# Patient Record
Sex: MALE | Race: WHITE | NOT HISPANIC OR LATINO | Employment: UNEMPLOYED | URBAN - METROPOLITAN AREA
[De-identification: names, ages, dates, MRNs, and addresses within clinical notes are randomized per-mention and may not be internally consistent; named-entity substitution may affect disease eponyms.]

---

## 2017-07-21 ENCOUNTER — LAB (OUTPATIENT)
Dept: INTERNAL MEDICINE | Facility: CLINIC | Age: 57
End: 2017-07-21

## 2017-07-21 DIAGNOSIS — Z00.00 HEALTHCARE MAINTENANCE: ICD-10-CM

## 2017-07-21 DIAGNOSIS — Z79.899 HIGH RISK MEDICATION USE: ICD-10-CM

## 2017-07-21 DIAGNOSIS — I10 ESSENTIAL HYPERTENSION: ICD-10-CM

## 2017-07-21 DIAGNOSIS — E78.1 FAMILIAL HYPERTRIGLYCERIDEMIA: ICD-10-CM

## 2017-07-21 DIAGNOSIS — E11.9 CONTROLLED TYPE 2 DIABETES MELLITUS WITHOUT COMPLICATION, WITHOUT LONG-TERM CURRENT USE OF INSULIN (HCC): ICD-10-CM

## 2017-07-21 DIAGNOSIS — E78.5 HYPERLIPIDEMIA, UNSPECIFIED HYPERLIPIDEMIA TYPE: Primary | ICD-10-CM

## 2017-07-21 DIAGNOSIS — Z12.5 PROSTATE CANCER SCREENING: ICD-10-CM

## 2017-07-21 LAB
ALBUMIN SERPL-MCNC: 4.4 G/DL (ref 3.2–4.8)
ALBUMIN/GLOB SERPL: 1.6 G/DL (ref 1.5–2.5)
ALP SERPL-CCNC: 99 U/L (ref 25–100)
ALT SERPL W P-5'-P-CCNC: 39 U/L (ref 7–40)
ANION GAP SERPL CALCULATED.3IONS-SCNC: 11 MMOL/L (ref 3–11)
ARTICHOKE IGE QN: 126 MG/DL (ref 0–130)
AST SERPL-CCNC: 33 U/L (ref 0–33)
BACTERIA UR QL AUTO: ABNORMAL /HPF
BASOPHILS # BLD AUTO: 0.04 10*3/MM3 (ref 0–0.2)
BASOPHILS NFR BLD AUTO: 0.4 % (ref 0–1)
BILIRUB SERPL-MCNC: 0.4 MG/DL (ref 0.3–1.2)
BILIRUB UR QL STRIP: NEGATIVE
BUN BLD-MCNC: 16 MG/DL (ref 9–23)
BUN/CREAT SERPL: 17.8 (ref 7–25)
CALCIUM SPEC-SCNC: 9.5 MG/DL (ref 8.7–10.4)
CHLORIDE SERPL-SCNC: 104 MMOL/L (ref 99–109)
CHOLEST SERPL-MCNC: 224 MG/DL (ref 0–200)
CLARITY UR: CLEAR
CO2 SERPL-SCNC: 22 MMOL/L (ref 20–31)
COLOR UR: YELLOW
CREAT BLD-MCNC: 0.9 MG/DL (ref 0.6–1.3)
DEPRECATED RDW RBC AUTO: 42.2 FL (ref 37–54)
EOSINOPHIL # BLD AUTO: 0.13 10*3/MM3 (ref 0–0.3)
EOSINOPHIL NFR BLD AUTO: 1.4 % (ref 0–3)
ERYTHROCYTE [DISTWIDTH] IN BLOOD BY AUTOMATED COUNT: 13.9 % (ref 11.3–14.5)
GFR SERPL CREATININE-BSD FRML MDRD: 87 ML/MIN/1.73
GLOBULIN UR ELPH-MCNC: 2.8 GM/DL
GLUCOSE BLD-MCNC: 127 MG/DL (ref 70–100)
GLUCOSE UR STRIP-MCNC: NEGATIVE MG/DL
HBA1C MFR BLD: 5.8 % (ref 4.8–5.6)
HCT VFR BLD AUTO: 48.9 % (ref 38.9–50.9)
HDLC SERPL-MCNC: 32 MG/DL (ref 40–60)
HGB BLD-MCNC: 16.2 G/DL (ref 13.1–17.5)
HGB UR QL STRIP.AUTO: NEGATIVE
HYALINE CASTS UR QL AUTO: ABNORMAL /LPF
IMM GRANULOCYTES # BLD: 0.01 10*3/MM3 (ref 0–0.03)
IMM GRANULOCYTES NFR BLD: 0.1 % (ref 0–0.6)
KETONES UR QL STRIP: NEGATIVE
LEUKOCYTE ESTERASE UR QL STRIP.AUTO: NEGATIVE
LYMPHOCYTES # BLD AUTO: 2.48 10*3/MM3 (ref 0.6–4.8)
LYMPHOCYTES NFR BLD AUTO: 26.4 % (ref 24–44)
MCH RBC QN AUTO: 28.1 PG (ref 27–31)
MCHC RBC AUTO-ENTMCNC: 33.1 G/DL (ref 32–36)
MCV RBC AUTO: 84.7 FL (ref 80–99)
MONOCYTES # BLD AUTO: 0.62 10*3/MM3 (ref 0–1)
MONOCYTES NFR BLD AUTO: 6.6 % (ref 0–12)
NEUTROPHILS # BLD AUTO: 6.1 10*3/MM3 (ref 1.5–8.3)
NEUTROPHILS NFR BLD AUTO: 65.1 % (ref 41–71)
NITRITE UR QL STRIP: NEGATIVE
PH UR STRIP.AUTO: <=5 [PH] (ref 5–8)
PLATELET # BLD AUTO: 214 10*3/MM3 (ref 150–450)
PMV BLD AUTO: 11.9 FL (ref 6–12)
POTASSIUM BLD-SCNC: 4.2 MMOL/L (ref 3.5–5.5)
PROT SERPL-MCNC: 7.2 G/DL (ref 5.7–8.2)
PROT UR QL STRIP: NEGATIVE
PSA SERPL-MCNC: 0.59 NG/ML (ref 0–4)
RBC # BLD AUTO: 5.77 10*6/MM3 (ref 4.2–5.76)
RBC # UR: ABNORMAL /HPF
REF LAB TEST METHOD: ABNORMAL
SODIUM BLD-SCNC: 137 MMOL/L (ref 132–146)
SP GR UR STRIP: 1.02 (ref 1–1.03)
SQUAMOUS #/AREA URNS HPF: ABNORMAL /HPF
TRIGL SERPL-MCNC: 417 MG/DL (ref 0–150)
TSH SERPL DL<=0.05 MIU/L-ACNC: 1.39 MIU/ML (ref 0.35–5.35)
UROBILINOGEN UR QL STRIP: NORMAL
WBC NRBC COR # BLD: 9.38 10*3/MM3 (ref 3.5–10.8)
WBC UR QL AUTO: ABNORMAL /HPF

## 2017-07-21 PROCEDURE — 80061 LIPID PANEL: CPT | Performed by: INTERNAL MEDICINE

## 2017-07-21 PROCEDURE — 82043 UR ALBUMIN QUANTITATIVE: CPT | Performed by: INTERNAL MEDICINE

## 2017-07-21 PROCEDURE — 84443 ASSAY THYROID STIM HORMONE: CPT | Performed by: INTERNAL MEDICINE

## 2017-07-21 PROCEDURE — 80053 COMPREHEN METABOLIC PANEL: CPT | Performed by: INTERNAL MEDICINE

## 2017-07-21 PROCEDURE — 81001 URINALYSIS AUTO W/SCOPE: CPT | Performed by: INTERNAL MEDICINE

## 2017-07-21 PROCEDURE — 82570 ASSAY OF URINE CREATININE: CPT | Performed by: INTERNAL MEDICINE

## 2017-07-21 PROCEDURE — 83036 HEMOGLOBIN GLYCOSYLATED A1C: CPT | Performed by: INTERNAL MEDICINE

## 2017-07-21 PROCEDURE — 85025 COMPLETE CBC W/AUTO DIFF WBC: CPT | Performed by: INTERNAL MEDICINE

## 2017-07-21 PROCEDURE — G0103 PSA SCREENING: HCPCS | Performed by: INTERNAL MEDICINE

## 2017-07-22 LAB
CREAT 24H UR-MCNC: 203.2 MG/DL
MICROALB/CRT. RATIO UR: 3.3 MG/G CREAT (ref 0–30)
MICROALBUMIN UR-MCNC: 6.8 UG/ML

## 2017-07-28 ENCOUNTER — OFFICE VISIT (OUTPATIENT)
Dept: INTERNAL MEDICINE | Facility: CLINIC | Age: 57
End: 2017-07-28

## 2017-07-28 VITALS
HEIGHT: 67 IN | WEIGHT: 185 LBS | SYSTOLIC BLOOD PRESSURE: 120 MMHG | DIASTOLIC BLOOD PRESSURE: 70 MMHG | BODY MASS INDEX: 29.03 KG/M2

## 2017-07-28 DIAGNOSIS — R31.9 HEMATURIA: ICD-10-CM

## 2017-07-28 DIAGNOSIS — E78.1 FAMILIAL HYPERTRIGLYCERIDEMIA: ICD-10-CM

## 2017-07-28 DIAGNOSIS — E78.2 MIXED HYPERLIPIDEMIA: ICD-10-CM

## 2017-07-28 DIAGNOSIS — Z00.00 MEDICARE ANNUAL WELLNESS VISIT, SUBSEQUENT: Primary | ICD-10-CM

## 2017-07-28 DIAGNOSIS — I10 ESSENTIAL HYPERTENSION: ICD-10-CM

## 2017-07-28 DIAGNOSIS — E11.9 CONTROLLED TYPE 2 DIABETES MELLITUS WITHOUT COMPLICATION, WITHOUT LONG-TERM CURRENT USE OF INSULIN (HCC): ICD-10-CM

## 2017-07-28 LAB
BACTERIA UR QL AUTO: ABNORMAL /HPF
BILIRUB UR QL STRIP: NEGATIVE
CLARITY UR: CLEAR
COLOR UR: YELLOW
GLUCOSE UR STRIP-MCNC: NEGATIVE MG/DL
HGB UR QL STRIP.AUTO: NEGATIVE
HYALINE CASTS UR QL AUTO: ABNORMAL /LPF
KETONES UR QL STRIP: NEGATIVE
LEUKOCYTE ESTERASE UR QL STRIP.AUTO: NEGATIVE
NITRITE UR QL STRIP: NEGATIVE
PH UR STRIP.AUTO: <=5 [PH] (ref 5–8)
PROT UR QL STRIP: NEGATIVE
RBC # UR: ABNORMAL /HPF
REF LAB TEST METHOD: ABNORMAL
SP GR UR STRIP: <=1.005 (ref 1–1.03)
SQUAMOUS #/AREA URNS HPF: ABNORMAL /HPF
UROBILINOGEN UR QL STRIP: NORMAL
WBC UR QL AUTO: ABNORMAL /HPF

## 2017-07-28 PROCEDURE — G0444 DEPRESSION SCREEN ANNUAL: HCPCS | Performed by: INTERNAL MEDICINE

## 2017-07-28 PROCEDURE — 99214 OFFICE O/P EST MOD 30 MIN: CPT | Performed by: INTERNAL MEDICINE

## 2017-07-28 PROCEDURE — 81001 URINALYSIS AUTO W/SCOPE: CPT | Performed by: INTERNAL MEDICINE

## 2017-07-28 PROCEDURE — 93000 ELECTROCARDIOGRAM COMPLETE: CPT | Performed by: INTERNAL MEDICINE

## 2017-07-28 PROCEDURE — G0439 PPPS, SUBSEQ VISIT: HCPCS | Performed by: INTERNAL MEDICINE

## 2017-07-28 RX ORDER — ATENOLOL 25 MG/1
25 TABLET ORAL DAILY
Qty: 90 TABLET | Refills: 3 | Status: SHIPPED | OUTPATIENT
Start: 2017-07-28 | End: 2018-07-30 | Stop reason: SDUPTHER

## 2017-07-28 NOTE — ASSESSMENT & PLAN NOTE
Health maintenance - counseling given and patient refuses all vaccines; Tdap 7/11; counseling re: colon CA and prostate CA screening and rationale for screening for early detection/intervention - patient refuses all screening; rec updated eye exam (wears readers; mother has mac degeneration); dental exam recommended (but patient reluctant)    Consultants:  Patient Care Team:  Vilma Brown MD as PCP - General (Internal Medicine)  Miguel Carrillo MD as PCP - Claims Attributed

## 2017-07-28 NOTE — ASSESSMENT & PLAN NOTE
BG control stable with A1C 5.8; encouraged reg phys activity to decr insulin resistance, moderation in unhealthy starches/sweets; f/u A1C in 6 mos recommended but patient wants to do it in 1yr ar next annual wellness

## 2017-07-28 NOTE — PROGRESS NOTES
ANNUAL WELLNESS VISIT    DRUG AND ALCOHOL USE      no alcohol use, no tobacco use and no caffeine use    DIET AND PHYSICAL ACTIVITY     Diet: general    Exercise: frequently   Exercise Details: walking, stretching     MOOD DISORDER AND COGNITIVE SCREENING   Depression Screening Tool Used yes - see PHQ-9   Anxiety Screening Tool Used yes     Mini-Cog Performed   Yes    1. Tell Patient 3 Words table,car,book    2. Administer Clock Test normal    3. Recall 3 words  table,car,book    4. Number Correct Items 3    FUNCTIONAL ABILITY AND LEVEL OF SAFETY   Hearing no hearing loss     Wears Hearing Aids No       Current Activities Independent      none  - see Funct/Cog Status Intake     Fall Risk Assessment       Has difficulty with walking or balance  No         Timed Up and Go (TUG) Test  5 sec.       If >12 sec, normal    ADVANCED DIRECTIVE has NO advance directive - information provided to the patient today    PAIN SCREENING Do you have pain right now? Yes - joint pain, back pain       If so, 1-10 scale: 7     Intermittent     Do you have pain every day? Yes      Probable chronic pain: Yes     Recent Hospitalizations:  No hospitalization(s) within the last year..     MEDICATION REVIEW   - updated and reviewed (see Medication List).   - reviewed for potentially harmful drug-disease interactions in the elderly.   - reviewed for high risk medications in the elderly.   - aspirin use: rec 81mg QD ASA  ____________________________________________  Chief Complaint   Patient presents with   • Medicare Wellness       History of Present Illness  57 y.o.  gentleman presents for updated wellness exam.  Still does not believe in screening tests nor vaccinations.  Continues to go to pain clinic fo percocet.    Review of Systems  Denies headaches, visual changes, CP, palpitations, SOB, cough, abd pain, n/v/d, difficulty with urination, numbness/tingling, falls, mood changes, lightheadedness, hearing changes, rashes.    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  Notes occ irritation as a result.  States he is not worried if there is blood in the urine as \"it is the will of God.\"     All other ROS reviewed and negative.    [facility]  The following portions of the patient's history were reviewed and updated as appropriate: allergies, current medications, past family history, past medical history, past social history, past surgical history and problem list.      Current Outpatient Prescriptions:   •  ALPRAZolam (XANAX) 1 MG tablet, Take 1 mg by mouth 2 (two) times a day as needed., Disp: , Rfl:   •  aspirin-acetaminophen-caffeine (EXCEDRIN MIGRAINE) 250-250-65 MG per tablet, Take 1 tablet by mouth as needed., Disp: , Rfl:   •  atenolol (TENORMIN) 25 MG tablet, Take 1 tablet by mouth Daily., Disp: 90 tablet, Rfl: 3  •  fluticasone (FLONASE) 50 MCG/ACT nasal spray, into each nostril as needed., Disp: , Rfl:   •  loratadine (ALAVERT) 10 MG tablet, Take 10 mg by mouth daily., Disp: , Rfl:   •  oxyCODONE-acetaminophen (PERCOCET)  MG per tablet, Take 1 tablet by mouth 3 (three) times a day as needed., Disp: , Rfl:     VITALS:  /70  Ht 67\" (170.2 cm)  Wt 185 lb (83.9 kg)  BMI 28.98 kg/m2    Physical Exam   Constitutional: He is oriented to person, place, and time. He appears well-developed and well-nourished.   HENT:   Head: Normocephalic.   Right Ear: External ear normal.   Left Ear: External ear normal.   Nose: Nose normal.   Mouth/Throat: Oropharynx is clear and moist and mucous membranes are normal. No oropharyngeal exudate.   Eyes: Conjunctivae and EOM are normal. Pupils are equal, round, and reactive to light.   Neck: Normal range of motion. Neck supple. Carotid bruit is not present (bilaterally). No thyromegaly present.   Cardiovascular: Regular rhythm and normal heart sounds.  " Bradycardia present.    Pulmonary/Chest: Effort normal and breath sounds normal. No respiratory distress.   Abdominal: Soft. Bowel sounds are normal. He exhibits no distension and no mass. There is no hepatosplenomegaly. There is no tenderness.   Musculoskeletal: Normal range of motion. He exhibits no edema.       Vascular Status -  His exam exhibits no right foot edema. His exam exhibits no left foot edema.   Skin Integrity  -  His right foot skin is intact.     Francisco 's left foot skin is intact. .  Lymphadenopathy:     He has no cervical adenopathy.   Neurological: He is alert and oriented to person, place, and time. He has normal reflexes. He displays normal reflexes. No cranial nerve deficit.   Skin: Skin is warm and dry. No rash noted.   Psychiatric: He has a normal mood and affect. His behavior is normal.   Nursing note and vitals reviewed.      LABS  Stable CBC, CMP (), TSH, PSA 0.59, microalb, A1C 5.8  Stable abnl lipids , , HDL 32,   UA (+)RBCs      ECG 12 Lead  Date/Time: 7/28/2017 11:43 PM  Performed by: NATHALIE SPRINGER  Authorized by: NATHALIE SPRINGER   Comparison: compared with previous ECG from 7/25/2016  Similar to previous ECG  Rhythm: sinus rhythm  Rate: normal  BPM: 55  Conduction: conduction normal  ST Segments: ST segments normal  QRS axis: normal  Other findings comments: inverted T wave III  Clinical impression comment: stable EKG            ASSESSMENT/PLAN  Problem List Items Addressed This Visit     Hyperlipidemia     Lipids bord with LDL < 100 and TGs elevated but good for the patient; no meds; decrease saturated fats/chol in the diet as tolerated         Hypertension     BP stable with stable electrolytes on atenolol 25mg QD #90, 3RF         Relevant Medications    atenolol (TENORMIN) 25 MG tablet    Controlled diabetes mellitus type II without complication     BG control stable with A1C 5.8; encouraged reg phys activity to decr insulin resistance, moderation in  unhealthy starches/sweets; f/u A1C in 6 mos recommended but patient wants to do it in 1yr ar next annual wellness           Familial hypertriglyceridemia     TGs elevated but good for the patient; he is aware of risks of elevated TGs without treatment and is satisfied         Medicare annual wellness visit, subsequent     Health maintenance - counseling given and patient refuses all vaccines; Tdap 7/11; counseling re: colon CA and prostate CA screening and rationale for screening for early detection/intervention - patient refuses all screening; rec updated eye exam (wears readers; mother has mac degeneration); dental exam recommended (but patient reluctant)    Consultants:  Patient Care Team:  Vilma Brown MD as PCP - General (Internal Medicine)  Miguel Carrillo MD as PCP - Claims Attributed               Other Visit Diagnoses     Hematuria    -  Primary    Relevant Orders    Urinalysis With Microscopic (Completed)    Urinalysis (Completed)    Urinalysis, Microscopic Only (Completed)          FOLLOW-UP  RTC 1yr for annual wellness visit; fasting labs the week prior to appt (CBC, CMP, TSH, lipids, UA/micro, A1C, microalb, PSA)      Electronically signed by:    Vilma Brown MD  07/28/2017

## 2017-07-28 NOTE — ASSESSMENT & PLAN NOTE
Lipids bord with LDL < 100 and TGs elevated but good for the patient; no meds; decrease saturated fats/chol in the diet as tolerated

## 2017-07-29 NOTE — ASSESSMENT & PLAN NOTE
TGs elevated but good for the patient; he is aware of risks of elevated TGs without treatment and is satisfied

## 2018-07-09 ENCOUNTER — TELEPHONE (OUTPATIENT)
Dept: INTERNAL MEDICINE | Facility: CLINIC | Age: 58
End: 2018-07-09

## 2018-07-09 DIAGNOSIS — I10 ESSENTIAL HYPERTENSION: ICD-10-CM

## 2018-07-09 DIAGNOSIS — E78.2 MIXED HYPERLIPIDEMIA: Primary | ICD-10-CM

## 2018-07-09 DIAGNOSIS — E11.9 CONTROLLED TYPE 2 DIABETES MELLITUS WITHOUT COMPLICATION, WITHOUT LONG-TERM CURRENT USE OF INSULIN (HCC): ICD-10-CM

## 2018-07-09 DIAGNOSIS — M19.90 OSTEOARTHRITIS, UNSPECIFIED OSTEOARTHRITIS TYPE, UNSPECIFIED SITE: ICD-10-CM

## 2018-07-09 DIAGNOSIS — N40.0 BENIGN PROSTATIC HYPERPLASIA, UNSPECIFIED WHETHER LOWER URINARY TRACT SYMPTOMS PRESENT: ICD-10-CM

## 2018-07-23 ENCOUNTER — LAB (OUTPATIENT)
Dept: INTERNAL MEDICINE | Facility: CLINIC | Age: 58
End: 2018-07-23

## 2018-07-23 DIAGNOSIS — E78.2 MIXED HYPERLIPIDEMIA: ICD-10-CM

## 2018-07-23 DIAGNOSIS — I10 ESSENTIAL HYPERTENSION: ICD-10-CM

## 2018-07-23 DIAGNOSIS — E11.9 CONTROLLED TYPE 2 DIABETES MELLITUS WITHOUT COMPLICATION, WITHOUT LONG-TERM CURRENT USE OF INSULIN (HCC): ICD-10-CM

## 2018-07-23 DIAGNOSIS — N40.0 BENIGN PROSTATIC HYPERPLASIA, UNSPECIFIED WHETHER LOWER URINARY TRACT SYMPTOMS PRESENT: ICD-10-CM

## 2018-07-23 LAB
ALBUMIN SERPL-MCNC: 4.52 G/DL (ref 3.2–4.8)
ALBUMIN/GLOB SERPL: 2.1 G/DL (ref 1.5–2.5)
ALP SERPL-CCNC: 137 U/L (ref 25–100)
ALT SERPL W P-5'-P-CCNC: 26 U/L (ref 7–40)
ANION GAP SERPL CALCULATED.3IONS-SCNC: 8 MMOL/L (ref 3–11)
ARTICHOKE IGE QN: 112 MG/DL (ref 0–130)
AST SERPL-CCNC: 29 U/L (ref 0–33)
BACTERIA UR QL AUTO: NORMAL /HPF
BASOPHILS # BLD AUTO: 0.04 10*3/MM3 (ref 0–0.2)
BASOPHILS NFR BLD AUTO: 0.7 % (ref 0–1)
BILIRUB SERPL-MCNC: 0.3 MG/DL (ref 0.3–1.2)
BILIRUB UR QL STRIP: NEGATIVE
BUN BLD-MCNC: 13 MG/DL (ref 9–23)
BUN/CREAT SERPL: 14.8 (ref 7–25)
CALCIUM SPEC-SCNC: 9 MG/DL (ref 8.7–10.4)
CHLORIDE SERPL-SCNC: 107 MMOL/L (ref 99–109)
CHOLEST SERPL-MCNC: 154 MG/DL (ref 0–200)
CLARITY UR: CLEAR
CO2 SERPL-SCNC: 23 MMOL/L (ref 20–31)
COLOR UR: YELLOW
CREAT BLD-MCNC: 0.88 MG/DL (ref 0.6–1.3)
DEPRECATED RDW RBC AUTO: 45.3 FL (ref 37–54)
EOSINOPHIL # BLD AUTO: 0.12 10*3/MM3 (ref 0–0.3)
EOSINOPHIL NFR BLD AUTO: 2.1 % (ref 0–3)
ERYTHROCYTE [DISTWIDTH] IN BLOOD BY AUTOMATED COUNT: 14.4 % (ref 11.3–14.5)
GFR SERPL CREATININE-BSD FRML MDRD: 89 ML/MIN/1.73
GLOBULIN UR ELPH-MCNC: 2.2 GM/DL
GLUCOSE BLD-MCNC: 119 MG/DL (ref 70–100)
GLUCOSE UR STRIP-MCNC: NEGATIVE MG/DL
HBA1C MFR BLD: 6.2 % (ref 4.8–5.6)
HCT VFR BLD AUTO: 46.8 % (ref 38.9–50.9)
HDLC SERPL-MCNC: 33 MG/DL (ref 40–60)
HGB BLD-MCNC: 15.3 G/DL (ref 13.1–17.5)
HGB UR QL STRIP.AUTO: NEGATIVE
HYALINE CASTS UR QL AUTO: NORMAL /LPF
IMM GRANULOCYTES # BLD: 0.01 10*3/MM3 (ref 0–0.03)
IMM GRANULOCYTES NFR BLD: 0.2 % (ref 0–0.6)
KETONES UR QL STRIP: NEGATIVE
LEUKOCYTE ESTERASE UR QL STRIP.AUTO: NEGATIVE
LYMPHOCYTES # BLD AUTO: 1.97 10*3/MM3 (ref 0.6–4.8)
LYMPHOCYTES NFR BLD AUTO: 34.3 % (ref 24–44)
MCH RBC QN AUTO: 28 PG (ref 27–31)
MCHC RBC AUTO-ENTMCNC: 32.7 G/DL (ref 32–36)
MCV RBC AUTO: 85.6 FL (ref 80–99)
MONOCYTES # BLD AUTO: 0.51 10*3/MM3 (ref 0–1)
MONOCYTES NFR BLD AUTO: 8.9 % (ref 0–12)
NEUTROPHILS # BLD AUTO: 3.1 10*3/MM3 (ref 1.5–8.3)
NEUTROPHILS NFR BLD AUTO: 53.8 % (ref 41–71)
NITRITE UR QL STRIP: NEGATIVE
PH UR STRIP.AUTO: <=5 [PH] (ref 5–8)
PLATELET # BLD AUTO: 250 10*3/MM3 (ref 150–450)
PMV BLD AUTO: 12.3 FL (ref 6–12)
POTASSIUM BLD-SCNC: 4.1 MMOL/L (ref 3.5–5.5)
PROT SERPL-MCNC: 6.7 G/DL (ref 5.7–8.2)
PROT UR QL STRIP: NEGATIVE
PSA SERPL-MCNC: 0.34 NG/ML (ref 0–4)
RBC # BLD AUTO: 5.47 10*6/MM3 (ref 4.2–5.76)
RBC # UR: NORMAL /HPF
REF LAB TEST METHOD: NORMAL
SODIUM BLD-SCNC: 138 MMOL/L (ref 132–146)
SP GR UR STRIP: 1.02 (ref 1–1.03)
SQUAMOUS #/AREA URNS HPF: NORMAL /HPF
TRIGL SERPL-MCNC: 129 MG/DL (ref 0–150)
TSH SERPL DL<=0.05 MIU/L-ACNC: 1.37 MIU/ML (ref 0.35–5.35)
UROBILINOGEN UR QL STRIP: NORMAL
WBC NRBC COR # BLD: 5.75 10*3/MM3 (ref 3.5–10.8)
WBC UR QL AUTO: NORMAL /HPF

## 2018-07-23 PROCEDURE — 80053 COMPREHEN METABOLIC PANEL: CPT | Performed by: INTERNAL MEDICINE

## 2018-07-23 PROCEDURE — 85025 COMPLETE CBC W/AUTO DIFF WBC: CPT | Performed by: INTERNAL MEDICINE

## 2018-07-23 PROCEDURE — 83036 HEMOGLOBIN GLYCOSYLATED A1C: CPT | Performed by: INTERNAL MEDICINE

## 2018-07-23 PROCEDURE — 84443 ASSAY THYROID STIM HORMONE: CPT | Performed by: INTERNAL MEDICINE

## 2018-07-23 PROCEDURE — 81001 URINALYSIS AUTO W/SCOPE: CPT | Performed by: INTERNAL MEDICINE

## 2018-07-23 PROCEDURE — 80061 LIPID PANEL: CPT | Performed by: INTERNAL MEDICINE

## 2018-07-23 PROCEDURE — 82043 UR ALBUMIN QUANTITATIVE: CPT | Performed by: INTERNAL MEDICINE

## 2018-07-23 PROCEDURE — 82570 ASSAY OF URINE CREATININE: CPT | Performed by: INTERNAL MEDICINE

## 2018-07-23 PROCEDURE — G0103 PSA SCREENING: HCPCS | Performed by: INTERNAL MEDICINE

## 2018-07-23 RX ORDER — ATENOLOL 25 MG/1
25 TABLET ORAL DAILY
Qty: 90 TABLET | Refills: 3 | OUTPATIENT
Start: 2018-07-23

## 2018-07-24 LAB
CREAT 24H UR-MCNC: 119.5 MG/DL
MICROALBUMIN UR-MCNC: 3.6 UG/ML
MICROALBUMIN/CREAT UR: 3 MG/G CREAT (ref 0–30)

## 2018-07-30 ENCOUNTER — OFFICE VISIT (OUTPATIENT)
Dept: INTERNAL MEDICINE | Facility: CLINIC | Age: 58
End: 2018-07-30

## 2018-07-30 VITALS
BODY MASS INDEX: 27.84 KG/M2 | HEIGHT: 67 IN | RESPIRATION RATE: 16 BRPM | DIASTOLIC BLOOD PRESSURE: 76 MMHG | WEIGHT: 177.38 LBS | HEART RATE: 66 BPM | SYSTOLIC BLOOD PRESSURE: 118 MMHG

## 2018-07-30 DIAGNOSIS — Z00.00 MEDICARE ANNUAL WELLNESS VISIT, SUBSEQUENT: Primary | ICD-10-CM

## 2018-07-30 DIAGNOSIS — E78.2 MIXED HYPERLIPIDEMIA: ICD-10-CM

## 2018-07-30 DIAGNOSIS — E11.9 CONTROLLED TYPE 2 DIABETES MELLITUS WITHOUT COMPLICATION, WITHOUT LONG-TERM CURRENT USE OF INSULIN (HCC): ICD-10-CM

## 2018-07-30 DIAGNOSIS — I10 ESSENTIAL HYPERTENSION: ICD-10-CM

## 2018-07-30 PROCEDURE — 93000 ELECTROCARDIOGRAM COMPLETE: CPT | Performed by: INTERNAL MEDICINE

## 2018-07-30 PROCEDURE — 99214 OFFICE O/P EST MOD 30 MIN: CPT | Performed by: INTERNAL MEDICINE

## 2018-07-30 PROCEDURE — G0439 PPPS, SUBSEQ VISIT: HCPCS | Performed by: INTERNAL MEDICINE

## 2018-07-30 RX ORDER — ATENOLOL 25 MG/1
25 TABLET ORAL DAILY
Qty: 90 TABLET | Refills: 3 | Status: SHIPPED | OUTPATIENT
Start: 2018-07-30 | End: 2019-07-31 | Stop reason: SDUPTHER
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
## 2018-07-30 NOTE — ASSESSMENT & PLAN NOTE
Health maintenance - counseling given and patient refuses all vaccines; counseling re: new Shingrix as well (declined); Tdap 7/11; counseling re: colon CA and prostate CA screening and rationale for screening for early detection/intervention - patient refuses all modalities of screening; rec updated eye exam (last yrs ago, color-blind, wears readers); rec updated dental exam (yrs ago); (+) seat belt use    Consultants:  Patient Care Team:  Vilma Brown MD as PCP - General (Internal Medicine)  Miguel Carrillo MD as PCP - Claims Attributed

## 2018-07-30 NOTE — ASSESSMENT & PLAN NOTE
BG control mildly worsened with A1C 6.2 (was 5.8 in 7/17) no current meds; encouraged reg phys activity to decr insulin resistance, moderation in unhealthy starches/sweets; rec f/u A1C in 6 mos but patient declines, stating he will wait until next year's annual visit

## 2018-07-30 NOTE — ASSESSMENT & PLAN NOTE
BP and electrolytes remains stable on atenolol 25mg QD #90, 3RF; rec low Na diet, increased aerobic exercise; home BP monitoring with goal BP < 130/80

## 2019-07-23 ENCOUNTER — TELEPHONE (OUTPATIENT)
Dept: INTERNAL MEDICINE | Facility: CLINIC | Age: 59
End: 2019-07-23

## 2019-07-23 DIAGNOSIS — E78.2 MIXED HYPERLIPIDEMIA: ICD-10-CM

## 2019-07-23 DIAGNOSIS — E11.9 CONTROLLED TYPE 2 DIABETES MELLITUS WITHOUT COMPLICATION, WITHOUT LONG-TERM CURRENT USE OF INSULIN (HCC): ICD-10-CM

## 2019-07-23 DIAGNOSIS — Z00.00 MEDICARE ANNUAL WELLNESS VISIT, SUBSEQUENT: Primary | ICD-10-CM

## 2019-07-23 DIAGNOSIS — I10 ESSENTIAL HYPERTENSION: ICD-10-CM

## 2019-07-29 ENCOUNTER — LAB (OUTPATIENT)
Dept: INTERNAL MEDICINE | Facility: CLINIC | Age: 59
End: 2019-07-29

## 2019-07-29 DIAGNOSIS — I10 ESSENTIAL HYPERTENSION: ICD-10-CM

## 2019-07-29 DIAGNOSIS — E78.2 MIXED HYPERLIPIDEMIA: ICD-10-CM

## 2019-07-29 DIAGNOSIS — Z00.00 MEDICARE ANNUAL WELLNESS VISIT, SUBSEQUENT: ICD-10-CM

## 2019-07-29 DIAGNOSIS — E11.9 CONTROLLED TYPE 2 DIABETES MELLITUS WITHOUT COMPLICATION, WITHOUT LONG-TERM CURRENT USE OF INSULIN (HCC): ICD-10-CM

## 2019-07-29 LAB
ALBUMIN SERPL-MCNC: 4.3 G/DL (ref 3.5–5.2)
ALBUMIN/GLOB SERPL: 1.7 G/DL
ALP SERPL-CCNC: 105 U/L (ref 39–117)
ALT SERPL W P-5'-P-CCNC: 19 U/L (ref 1–41)
ANION GAP SERPL CALCULATED.3IONS-SCNC: 11.3 MMOL/L (ref 5–15)
AST SERPL-CCNC: 27 U/L (ref 1–40)
BACTERIA UR QL AUTO: NORMAL /HPF
BASOPHILS # BLD AUTO: 0.07 10*3/MM3 (ref 0–0.2)
BASOPHILS NFR BLD AUTO: 1.1 % (ref 0–1.5)
BILIRUB SERPL-MCNC: 0.5 MG/DL (ref 0.2–1.2)
BILIRUB UR QL STRIP: NEGATIVE
BUN BLD-MCNC: 11 MG/DL (ref 6–20)
BUN/CREAT SERPL: 13.4 (ref 7–25)
CALCIUM SPEC-SCNC: 9.1 MG/DL (ref 8.6–10.5)
CHLORIDE SERPL-SCNC: 105 MMOL/L (ref 98–107)
CHOLEST SERPL-MCNC: 149 MG/DL (ref 0–200)
CLARITY UR: CLEAR
CO2 SERPL-SCNC: 23.7 MMOL/L (ref 22–29)
COLOR UR: YELLOW
CREAT BLD-MCNC: 0.82 MG/DL (ref 0.76–1.27)
DEPRECATED RDW RBC AUTO: 47.4 FL (ref 37–54)
EOSINOPHIL # BLD AUTO: 0.18 10*3/MM3 (ref 0–0.4)
EOSINOPHIL NFR BLD AUTO: 2.8 % (ref 0.3–6.2)
ERYTHROCYTE [DISTWIDTH] IN BLOOD BY AUTOMATED COUNT: 14.9 % (ref 12.3–15.4)
GFR SERPL CREATININE-BSD FRML MDRD: 96 ML/MIN/1.73
GLOBULIN UR ELPH-MCNC: 2.5 GM/DL
GLUCOSE BLD-MCNC: 106 MG/DL (ref 65–99)
GLUCOSE UR STRIP-MCNC: NEGATIVE MG/DL
HBA1C MFR BLD: 6.22 % (ref 4.8–5.6)
HCT VFR BLD AUTO: 47.3 % (ref 37.5–51)
HDLC SERPL-MCNC: 37 MG/DL (ref 40–60)
HGB BLD-MCNC: 14.8 G/DL (ref 13–17.7)
HGB UR QL STRIP.AUTO: NEGATIVE
HYALINE CASTS UR QL AUTO: NORMAL /LPF
IMM GRANULOCYTES # BLD AUTO: 0.02 10*3/MM3 (ref 0–0.05)
IMM GRANULOCYTES NFR BLD AUTO: 0.3 % (ref 0–0.5)
KETONES UR QL STRIP: NEGATIVE
LDLC SERPL CALC-MCNC: 91 MG/DL (ref 0–100)
LDLC/HDLC SERPL: 2.45 {RATIO}
LEUKOCYTE ESTERASE UR QL STRIP.AUTO: NEGATIVE
LYMPHOCYTES # BLD AUTO: 1.69 10*3/MM3 (ref 0.7–3.1)
LYMPHOCYTES NFR BLD AUTO: 26.7 % (ref 19.6–45.3)
MCH RBC QN AUTO: 27 PG (ref 26.6–33)
MCHC RBC AUTO-ENTMCNC: 31.3 G/DL (ref 31.5–35.7)
MCV RBC AUTO: 86.2 FL (ref 79–97)
MONOCYTES # BLD AUTO: 0.6 10*3/MM3 (ref 0.1–0.9)
MONOCYTES NFR BLD AUTO: 9.5 % (ref 5–12)
NEUTROPHILS # BLD AUTO: 3.77 10*3/MM3 (ref 1.7–7)
NEUTROPHILS NFR BLD AUTO: 59.6 % (ref 42.7–76)
NITRITE UR QL STRIP: NEGATIVE
NRBC BLD AUTO-RTO: 0 /100 WBC (ref 0–0.2)
PH UR STRIP.AUTO: 6 [PH] (ref 5–8)
PLATELET # BLD AUTO: 285 10*3/MM3 (ref 140–450)
PMV BLD AUTO: 11.7 FL (ref 6–12)
POTASSIUM BLD-SCNC: 4.1 MMOL/L (ref 3.5–5.2)
PROT SERPL-MCNC: 6.8 G/DL (ref 6–8.5)
PROT UR QL STRIP: NEGATIVE
PSA SERPL-MCNC: 0.54 NG/ML (ref 0–4)
RBC # BLD AUTO: 5.49 10*6/MM3 (ref 4.14–5.8)
RBC # UR: NORMAL /HPF
REF LAB TEST METHOD: NORMAL
SODIUM BLD-SCNC: 140 MMOL/L (ref 136–145)
SP GR UR STRIP: 1.01 (ref 1–1.03)
SQUAMOUS #/AREA URNS HPF: NORMAL /HPF
TRIGL SERPL-MCNC: 107 MG/DL (ref 0–150)
TSH SERPL DL<=0.05 MIU/L-ACNC: 1.35 MIU/ML (ref 0.27–4.2)
UROBILINOGEN UR QL STRIP: NORMAL
VLDLC SERPL-MCNC: 21.4 MG/DL (ref 5–40)
WBC NRBC COR # BLD: 6.33 10*3/MM3 (ref 3.4–10.8)
WBC UR QL AUTO: NORMAL /HPF

## 2019-07-29 PROCEDURE — 84443 ASSAY THYROID STIM HORMONE: CPT | Performed by: INTERNAL MEDICINE

## 2019-07-29 PROCEDURE — 80061 LIPID PANEL: CPT | Performed by: INTERNAL MEDICINE

## 2019-07-29 PROCEDURE — 82043 UR ALBUMIN QUANTITATIVE: CPT | Performed by: INTERNAL MEDICINE

## 2019-07-29 PROCEDURE — 80053 COMPREHEN METABOLIC PANEL: CPT | Performed by: INTERNAL MEDICINE

## 2019-07-29 PROCEDURE — 81001 URINALYSIS AUTO W/SCOPE: CPT | Performed by: INTERNAL MEDICINE

## 2019-07-29 PROCEDURE — 82570 ASSAY OF URINE CREATININE: CPT | Performed by: INTERNAL MEDICINE

## 2019-07-29 PROCEDURE — 85025 COMPLETE CBC W/AUTO DIFF WBC: CPT | Performed by: INTERNAL MEDICINE

## 2019-07-29 PROCEDURE — G0103 PSA SCREENING: HCPCS | Performed by: INTERNAL MEDICINE

## 2019-07-29 PROCEDURE — 83036 HEMOGLOBIN GLYCOSYLATED A1C: CPT | Performed by: INTERNAL MEDICINE

## 2019-07-30 LAB
ALBUMIN UR-MCNC: <1.2 MG/L
CREAT UR-MCNC: 61.1 MG/DL
MICROALBUMIN/CREAT UR: NORMAL MG/G

## 2019-08-01 NOTE — ASSESSMENT & PLAN NOTE
Health maintenance - counseling given and patient refuses all vaccines; Tdap 7/11 (Td due 2021); updated new recs re: HAV and Shingrix - patient declines interest; counseling re: colon CA and prostate CA screening and rationale for screening for early detection/intervention - patient refuses all screening, updated him re: option of Cologuard; rec eye exam, which patient declines, noting FH mac degeneration; rec regular dental exam but patient cannot find dentist he trusts - encouraged reg brushing and flossing; (+) seat belt use    Consultants:  Patient Care Team:  Vilma Brown MD as PCP - General (Internal Medicine)

## 2019-08-01 NOTE — ASSESSMENT & PLAN NOTE
BG control stable with A1C 6.22; no meds; encouraged reg phys activity to decr insulin resistance, moderation in unhealthy starches/sweets; f/u A1C in 12 mos per pt

## 2019-08-02 ENCOUNTER — OFFICE VISIT (OUTPATIENT)
Dept: INTERNAL MEDICINE | Facility: CLINIC | Age: 59
End: 2019-08-02

## 2019-08-02 VITALS
HEART RATE: 66 BPM | OXYGEN SATURATION: 98 % | SYSTOLIC BLOOD PRESSURE: 140 MMHG | HEIGHT: 67 IN | DIASTOLIC BLOOD PRESSURE: 90 MMHG | BODY MASS INDEX: 25.9 KG/M2 | WEIGHT: 165 LBS

## 2019-08-02 DIAGNOSIS — E78.1 FAMILIAL HYPERTRIGLYCERIDEMIA: ICD-10-CM

## 2019-08-02 DIAGNOSIS — E11.65 TYPE 2 DIABETES MELLITUS WITH HYPERGLYCEMIA, WITHOUT LONG-TERM CURRENT USE OF INSULIN (HCC): ICD-10-CM

## 2019-08-02 DIAGNOSIS — I10 ESSENTIAL HYPERTENSION: ICD-10-CM

## 2019-08-02 DIAGNOSIS — E78.2 MIXED HYPERLIPIDEMIA: ICD-10-CM

## 2019-08-02 DIAGNOSIS — Z00.00 MEDICARE ANNUAL WELLNESS VISIT, SUBSEQUENT: Primary | ICD-10-CM

## 2019-08-02 PROCEDURE — G0439 PPPS, SUBSEQ VISIT: HCPCS | Performed by: INTERNAL MEDICINE

## 2019-08-02 PROCEDURE — 93000 ELECTROCARDIOGRAM COMPLETE: CPT | Performed by: INTERNAL MEDICINE

## 2019-08-02 PROCEDURE — G0444 DEPRESSION SCREEN ANNUAL: HCPCS | Performed by: INTERNAL MEDICINE

## 2019-08-02 RX ORDER — ATENOLOL 25 MG/1
25 TABLET ORAL DAILY
Qty: 90 TABLET | Refills: 3 | Status: SHIPPED | OUTPATIENT
Start: 2019-08-02 | End: 2019-11-05 | Stop reason: SDUPTHER

## 2019-08-02 NOTE — PROGRESS NOTES
"  ANNUAL WELLNESS VISIT    DRUG AND ALCOHOL USE      no alcohol use, no tobacco use and no caffeine use    DIET AND PHYSICAL ACTIVITY     Diet: general    Exercise: daily   Exercise Details: walking    MOOD DISORDER AND COGNITIVE SCREENING   Depression Screening Tool Used yes - see PHQ-9; components reviewed with patient; 15- min counseling re: questionnaire items; occ feeling blue but emphasized \"I am blessed\"; enjoys time with fam and friends; daughter in [place] keeps in touch - 1 granddaughter with full scholarship to college; grandson with cerebral palsy (one arm shorter) but working as  (like patient did); has chronic issues with pain and sleep, also assoc'd with waxing and waning energy; occ has some difficulty with concentration; overall stable - no interest in meds or counseling   Anxiety Screening Tool Used yes     Mini-Cog Performed   Yes    1. Tell Patient 3 Words apple table thelma    2. Administer Clock Test normal    3. Recall 3 words  apple table thelma    4. Number Correct Items 3    FUNCTIONAL ABILITY AND LEVEL OF SAFETY   Hearing Mild hearing loss     Wears Hearing Aids No       Current Activities Independent      none  - see Funct/Cog Status Intake     Fall Risk Assessment       Has difficulty with walking or balance  No             Timed Up and Go (TUG) Test  9 sec.      If >12 sec, normal      ADVANCED DIRECTIVE Patient does not have an advance directive - not interested in additional information    PAIN SCREENING Do you have pain right now? yes      If so, 1-10 scale: 4     Intermittent      Do you have pain every day? No      Probable chronic pain: Yes     Recent Hospitalizations:  No recent hospitalization(s)..     MEDICATION REVIEW   - updated and reviewed (see Medication List).   - reviewed for potentially harmful drug-disease interactions in the elderly.   - reviewed for high risk medications in the elderly.   - aspirin use: No    BMI  Body mass index is 25.84 kg/m².    Patient's " "Body mass index is 25.84 kg/m². BMI is above normal parameters. Recommendations include: exercise counseling and nutrition counseling.    _________________________________________________________  Chief Complaint   Patient presents with   • Medicare Wellness-subsequent   • Hypertension   • Hyperlipidemia       History of Present Illness  59 y.o.  gentleman presents for updated wellness visit.  States BPs have been 130/60s; pulse in the 50-60s, even with decreased dose of atenolol. States increased pain today; also stress of driving here, thinks this is why BP is elevated today.  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    Review of Systems  Denies headaches, visual changes (uses readers, does not go to eye doctor), CP, palpitations, SOB, cough, abd pain, n/v/d, difficulty with urination, numbness/tingling, falls, mood changes, lightheadedness, hearing changes, rashes.    Does not go to dentist; has no trust ever since fillings fell out long time ago.     All other ROS reviewed and negative.    [facility]  The following portions of the patient's history were reviewed and updated as appropriate: allergies, current medications, past family history, past medical history, past social history, past surgical history and problem list.  FH: denies colon CA in the family    Current Outpatient Medications:   •  ALPRAZolam (XANAX) 1 MG tablet, BID prn  •  aspirin-acetaminophen-caffeine (EXCEDRIN MIGRAINE) 250-250-65 MG prn  •  atenolol (TENORMIN) 25 MG QD  •  fluticasone (FLONASE) 50 MCG/ACT nasal spray AD  •  loratadine (ALAVERT) 10 MG QD  •  oxyCODONE-acetaminophen (PERCOCET)  MG TID prn      VITALS:  /90   Pulse 66   Ht 170.2 cm (67\")   Wt 74.8 kg (165 lb)   SpO2 98%   BMI 25.84 kg/m²     Physical Exam   Constitutional: He is oriented to person, place, and time. He appears well-developed and well-nourished.   HENT:   Head: Normocephalic.   Right Ear: External ear normal.   Left Ear: External ear " normal.   Nose: Nose normal.   Mouth/Throat: Oropharynx is clear and moist and mucous membranes are normal. No oropharyngeal exudate.   Eyes: Conjunctivae and EOM are normal. Pupils are equal, round, and reactive to light.   Neck: Normal range of motion. Neck supple. Carotid bruit is not present (bilaterally). No thyromegaly present.   Cardiovascular: Normal rate, regular rhythm and normal heart sounds.   Pulmonary/Chest: Effort normal and breath sounds normal. No respiratory distress. He has no wheezes. He has no rales.   Abdominal: Soft. Bowel sounds are normal. He exhibits no distension and no mass. There is no hepatosplenomegaly. There is no tenderness.   Genitourinary:   Genitourinary Comments: declines /NIR exam   Musculoskeletal: Normal range of motion. He exhibits no edema.   Lymphadenopathy:     He has no cervical adenopathy.   Neurological: He is alert and oriented to person, place, and time. He displays normal reflexes. No cranial nerve deficit.   Skin: Skin is warm and dry. No rash noted.   Psychiatric: He has a normal mood and affect. His behavior is normal.   Nursing note and vitals reviewed.      LABS  Results for orders placed or performed in visit on 07/29/19   Comprehensive Metabolic Panel   Result Value Ref Range    Glucose 106 (H) 65 - 99 mg/dL    BUN 11 6 - 20 mg/dL    Creatinine 0.82 0.76 - 1.27 mg/dL    Sodium 140 136 - 145 mmol/L    Potassium 4.1 3.5 - 5.2 mmol/L    Chloride 105 98 - 107 mmol/L    CO2 23.7 22.0 - 29.0 mmol/L    Calcium 9.1 8.6 - 10.5 mg/dL    Total Protein 6.8 6.0 - 8.5 g/dL    Albumin 4.30 3.50 - 5.20 g/dL    ALT (SGPT) 19 1 - 41 U/L    AST (SGOT) 27 1 - 40 U/L    Alkaline Phosphatase 105 39 - 117 U/L    Total Bilirubin 0.5 0.2 - 1.2 mg/dL    eGFR Non African Amer 96 >60 mL/min/1.73    Globulin 2.5 gm/dL    A/G Ratio 1.7 g/dL    BUN/Creatinine Ratio 13.4 7.0 - 25.0    Anion Gap 11.3 5.0 - 15.0 mmol/L   Lipid Panel   Result Value Ref Range    Total Cholesterol 149 0 - 200  mg/dL    Triglycerides 107 0 - 150 mg/dL    HDL Cholesterol 37 (L) 40 - 60 mg/dL    LDL Cholesterol  91 0 - 100 mg/dL    VLDL Cholesterol 21.4 5 - 40 mg/dL    LDL/HDL Ratio 2.45    TSH   Result Value Ref Range    TSH 1.350 0.270 - 4.200 mIU/mL   Microalbumin / Creatinine Urine Ratio - Urine, Clean Catch   Result Value Ref Range    Microalbumin/Creatinine Ratio  mg/g    Creatinine, Urine 61.1 mg/dL    Microalbumin, Urine <1.2 mg/L   PSA Screen   Result Value Ref Range    PSA 0.537 0.000 - 4.000 ng/mL   Hemoglobin A1c   Result Value Ref Range    Hemoglobin A1C 6.22 (H) 4.80 - 5.60 %   CBC Auto Differential   Result Value Ref Range    WBC 6.33 3.40 - 10.80 10*3/mm3    RBC 5.49 4.14 - 5.80 10*6/mm3    Hemoglobin 14.8 13.0 - 17.7 g/dL    Hematocrit 47.3 37.5 - 51.0 %    MCV 86.2 79.0 - 97.0 fL    MCH 27.0 26.6 - 33.0 pg    MCHC 31.3 (L) 31.5 - 35.7 g/dL    RDW 14.9 12.3 - 15.4 %    RDW-SD 47.4 37.0 - 54.0 fl    MPV 11.7 6.0 - 12.0 fL    Platelets 285 140 - 450 10*3/mm3    Neutrophil % 59.6 42.7 - 76.0 %    Lymphocyte % 26.7 19.6 - 45.3 %    Monocyte % 9.5 5.0 - 12.0 %    Eosinophil % 2.8 0.3 - 6.2 %    Basophil % 1.1 0.0 - 1.5 %    Immature Grans % 0.3 0.0 - 0.5 %    Neutrophils, Absolute 3.77 1.70 - 7.00 10*3/mm3    Lymphocytes, Absolute 1.69 0.70 - 3.10 10*3/mm3    Monocytes, Absolute 0.60 0.10 - 0.90 10*3/mm3    Eosinophils, Absolute 0.18 0.00 - 0.40 10*3/mm3    Basophils, Absolute 0.07 0.00 - 0.20 10*3/mm3    Immature Grans, Absolute 0.02 0.00 - 0.05 10*3/mm3    nRBC 0.0 0.0 - 0.2 /100 WBC   Urinalysis without microscopic (no culture) - Urine, Clean Catch   Result Value Ref Range    Color, UA Yellow Yellow, Straw    Appearance, UA Clear Clear    pH, UA 6.0 5.0 - 8.0    Specific Gravity, UA 1.012 1.005 - 1.030    Glucose, UA Negative Negative    Ketones, UA Negative Negative    Bilirubin, UA Negative Negative    Blood, UA Negative Negative    Protein, UA Negative Negative    Leuk Esterase, UA Negative Negative     Nitrite, UA Negative Negative    Urobilinogen, UA 0.2 E.U./dL 0.2 - 1.0 E.U./dL   Urinalysis, Microscopic Only - Urine, Clean Catch   Result Value Ref Range    RBC, UA 0-2 None Seen, 0-2 /HPF    WBC, UA 0-2 None Seen, 0-2 /HPF    Bacteria, UA None Seen None Seen /HPF    Squamous Epithelial Cells, UA 0-2 None Seen, 0-2 /HPF    Hyaline Casts, UA None Seen None Seen /LPF    Methodology Automated Microscopy        ECG 12 Lead  Date/Time: 8/2/2019 10:13 AM  Performed by: Vilma Brown MD  Authorized by: Vilma Brown MD   Comparison: compared with previous ECG from 7/30/2018  Similar to previous ECG  Rhythm: sinus rhythm  Rate: normal  BPM: 65  Conduction: conduction normal  ST Segments: ST segments normal  T Waves: T waves normal  T inversion: III (isolated)  QRS axis: normal  Clinical impression comment: stable EKG            ASSESSMENT/PLAN  Problem List Items Addressed This Visit     Type 2 diabetes mellitus with hyperglycemia, without long-term current use of insulin (CMS/[facility])     BG control stable with A1C 6.22; no meds; encouraged reg phys activity to decr insulin resistance, moderation in unhealthy starches/sweets; f/u A1C in 12 mos per pt           Medicare annual wellness visit, subsequent - Primary     Health maintenance - counseling given and patient refuses all vaccines; Tdap 7/11 (Td due 2021); updated new recs re: HAV and Shingrix - patient declines interest; counseling re: colon CA and prostate CA screening and rationale for screening for early detection/intervention - patient refuses all screening, updated him re: option of Cologuard; rec eye exam, which patient declines, noting FH mac degeneration; rec regular dental exam but patient cannot find dentist he trusts - encouraged reg brushing and flossing; (+) seat belt use    Consultants:  Patient Care Team:  Vilma Brown MD as PCP - General (Internal Medicine)             Hypertension     BP and electrolytes stable on atenolol 25mg QD #90, 3RF          Relevant Medications    atenolol (TENORMIN) 25 MG tablet    Other Relevant Orders    ECG 12 Lead (Completed)    Hyperlipidemia     Lipids stable/acceptable; LDL bord 91 (Goal <70) and TGs wnl at 107         Familial hypertriglyceridemia     Unexpected excellent ; no meds               FOLLOW-UP  RTC 1yr for annual wellness; fasting labs prior to appt (CBC, CMP, TSH, lipids, UA/micr, PSA, A1C, microalb)    Electronically signed by:    Vilma Brown MD  08/02/2019

## 2019-11-05 DIAGNOSIS — I10 ESSENTIAL HYPERTENSION: ICD-10-CM

## 2019-11-05 RX ORDER — ATENOLOL 25 MG/1
TABLET ORAL
Qty: 90 TABLET | Refills: 3 | Status: SHIPPED | OUTPATIENT
Start: 2019-11-05 | End: 2020-07-22

## 2020-07-22 DIAGNOSIS — I10 ESSENTIAL HYPERTENSION: ICD-10-CM

## 2020-07-22 RX ORDER — ATENOLOL 25 MG/1
TABLET ORAL
Qty: 90 TABLET | Refills: 3 | Status: SHIPPED | OUTPATIENT
Start: 2020-07-22 | End: 2020-08-03 | Stop reason: SDUPTHER

## 2020-07-27 ENCOUNTER — TELEPHONE (OUTPATIENT)
Dept: INTERNAL MEDICINE | Facility: CLINIC | Age: 60
End: 2020-07-27

## 2020-07-27 DIAGNOSIS — Z12.5 SCREENING FOR PROSTATE CANCER: ICD-10-CM

## 2020-07-27 DIAGNOSIS — I10 ESSENTIAL HYPERTENSION: ICD-10-CM

## 2020-07-27 DIAGNOSIS — Z00.00 MEDICARE ANNUAL WELLNESS VISIT, SUBSEQUENT: Primary | ICD-10-CM

## 2020-07-27 DIAGNOSIS — E78.2 MIXED HYPERLIPIDEMIA: ICD-10-CM

## 2020-07-27 DIAGNOSIS — E11.65 TYPE 2 DIABETES MELLITUS WITH HYPERGLYCEMIA, WITHOUT LONG-TERM CURRENT USE OF INSULIN (HCC): ICD-10-CM

## 2020-08-02 NOTE — ASSESSMENT & PLAN NOTE
Update A1C; ADDENDUM: stable/improved A1C 5.7; encouraged reg phys activity to decr insulin resistance, moderation in unhealthy starches/sweets; f/u A1C in 12 mos

## 2020-08-02 NOTE — ASSESSMENT & PLAN NOTE
Update lipids with goal LDL < 130, goal TG < 150; no meds; ADDENDUM: lipids at goal with LDL 93 and , no meds

## 2020-08-02 NOTE — ASSESSMENT & PLAN NOTE
Update lipids with goal LDL < 130, goal TG < 150; no meds; ADDENDUM: TGs very good at 135, no meds

## 2020-08-02 NOTE — ASSESSMENT & PLAN NOTE
Health maintenance - counseling given and patient refuses all vaccines; Tdap 7/11 (Next Td due 2021 if patient willing); counseling re: colon CA and prostate CA screening and rationale for screening for early detection/intervention - patient refuses all screening; rec updated eye exam (yrs ago); rec updated dental exam patient declines); (+) seat belt use    Consultants:  Pain clinic - name unknown

## 2020-08-03 ENCOUNTER — LAB (OUTPATIENT)
Dept: LAB | Facility: HOSPITAL | Age: 60
End: 2020-08-03

## 2020-08-03 ENCOUNTER — OFFICE VISIT (OUTPATIENT)
Dept: INTERNAL MEDICINE | Facility: CLINIC | Age: 60
End: 2020-08-03

## 2020-08-03 ENCOUNTER — TELEPHONE (OUTPATIENT)
Dept: INTERNAL MEDICINE | Facility: CLINIC | Age: 60
End: 2020-08-03

## 2020-08-03 VITALS
WEIGHT: 163 LBS | HEART RATE: 54 BPM | BODY MASS INDEX: 25.58 KG/M2 | OXYGEN SATURATION: 99 % | SYSTOLIC BLOOD PRESSURE: 134 MMHG | DIASTOLIC BLOOD PRESSURE: 78 MMHG | HEIGHT: 67 IN

## 2020-08-03 DIAGNOSIS — K21.9 GASTROESOPHAGEAL REFLUX DISEASE, ESOPHAGITIS PRESENCE NOT SPECIFIED: ICD-10-CM

## 2020-08-03 DIAGNOSIS — E78.1 FAMILIAL HYPERTRIGLYCERIDEMIA: ICD-10-CM

## 2020-08-03 DIAGNOSIS — I10 ESSENTIAL HYPERTENSION: ICD-10-CM

## 2020-08-03 DIAGNOSIS — Z00.00 MEDICARE ANNUAL WELLNESS VISIT, SUBSEQUENT: ICD-10-CM

## 2020-08-03 DIAGNOSIS — Z12.5 SCREENING FOR PROSTATE CANCER: ICD-10-CM

## 2020-08-03 DIAGNOSIS — Z00.00 MEDICARE ANNUAL WELLNESS VISIT, SUBSEQUENT: Primary | ICD-10-CM

## 2020-08-03 DIAGNOSIS — E78.2 MIXED HYPERLIPIDEMIA: ICD-10-CM

## 2020-08-03 DIAGNOSIS — E11.65 TYPE 2 DIABETES MELLITUS WITH HYPERGLYCEMIA, WITHOUT LONG-TERM CURRENT USE OF INSULIN (HCC): ICD-10-CM

## 2020-08-03 LAB
ALBUMIN SERPL-MCNC: 4.5 G/DL (ref 3.5–5.2)
ALBUMIN UR-MCNC: <1.2 MG/DL
ALBUMIN/GLOB SERPL: 1.8 G/DL
ALP SERPL-CCNC: 91 U/L (ref 39–117)
ALT SERPL W P-5'-P-CCNC: 14 U/L (ref 1–41)
ANION GAP SERPL CALCULATED.3IONS-SCNC: 9.3 MMOL/L (ref 5–15)
AST SERPL-CCNC: 18 U/L (ref 1–40)
BASOPHILS # BLD AUTO: 0.04 10*3/MM3 (ref 0–0.2)
BASOPHILS NFR BLD AUTO: 0.4 % (ref 0–1.5)
BILIRUB SERPL-MCNC: 0.5 MG/DL (ref 0–1.2)
BILIRUB UR QL STRIP: NEGATIVE
BUN SERPL-MCNC: 8 MG/DL (ref 8–23)
BUN/CREAT SERPL: 10.1 (ref 7–25)
CALCIUM SPEC-SCNC: 9.2 MG/DL (ref 8.6–10.5)
CHLORIDE SERPL-SCNC: 106 MMOL/L (ref 98–107)
CHOLEST SERPL-MCNC: 154 MG/DL (ref 0–200)
CLARITY UR: ABNORMAL
CO2 SERPL-SCNC: 24.7 MMOL/L (ref 22–29)
COLOR UR: YELLOW
CREAT SERPL-MCNC: 0.79 MG/DL (ref 0.76–1.27)
CREAT UR-MCNC: 176.1 MG/DL
DEPRECATED RDW RBC AUTO: 43 FL (ref 37–54)
EOSINOPHIL # BLD AUTO: 0.04 10*3/MM3 (ref 0–0.4)
EOSINOPHIL NFR BLD AUTO: 0.4 % (ref 0.3–6.2)
ERYTHROCYTE [DISTWIDTH] IN BLOOD BY AUTOMATED COUNT: 14.1 % (ref 12.3–15.4)
GFR SERPL CREATININE-BSD FRML MDRD: 100 ML/MIN/1.73
GLOBULIN UR ELPH-MCNC: 2.5 GM/DL
GLUCOSE SERPL-MCNC: 104 MG/DL (ref 65–99)
GLUCOSE UR STRIP-MCNC: NEGATIVE MG/DL
HBA1C MFR BLD: 5.7 % (ref 4.8–5.6)
HCT VFR BLD AUTO: 45.9 % (ref 37.5–51)
HCV AB SER DONR QL: NORMAL
HDLC SERPL-MCNC: 34 MG/DL (ref 40–60)
HGB BLD-MCNC: 14.9 G/DL (ref 13–17.7)
HGB UR QL STRIP.AUTO: NEGATIVE
IMM GRANULOCYTES # BLD AUTO: 0.02 10*3/MM3 (ref 0–0.05)
IMM GRANULOCYTES NFR BLD AUTO: 0.2 % (ref 0–0.5)
KETONES UR QL STRIP: NEGATIVE
LDLC SERPL CALC-MCNC: 93 MG/DL (ref 0–100)
LDLC/HDLC SERPL: 2.74 {RATIO}
LEUKOCYTE ESTERASE UR QL STRIP.AUTO: NEGATIVE
LYMPHOCYTES # BLD AUTO: 1.26 10*3/MM3 (ref 0.7–3.1)
LYMPHOCYTES NFR BLD AUTO: 14.1 % (ref 19.6–45.3)
MCH RBC QN AUTO: 27.2 PG (ref 26.6–33)
MCHC RBC AUTO-ENTMCNC: 32.5 G/DL (ref 31.5–35.7)
MCV RBC AUTO: 83.9 FL (ref 79–97)
MICROALBUMIN/CREAT UR: NORMAL MG/G{CREAT}
MONOCYTES # BLD AUTO: 0.63 10*3/MM3 (ref 0.1–0.9)
MONOCYTES NFR BLD AUTO: 7 % (ref 5–12)
NEUTROPHILS NFR BLD AUTO: 6.95 10*3/MM3 (ref 1.7–7)
NEUTROPHILS NFR BLD AUTO: 77.9 % (ref 42.7–76)
NITRITE UR QL STRIP: NEGATIVE
NRBC BLD AUTO-RTO: 0 /100 WBC (ref 0–0.2)
PH UR STRIP.AUTO: <=5 [PH] (ref 5–8)
PLATELET # BLD AUTO: 236 10*3/MM3 (ref 140–450)
PMV BLD AUTO: 11.6 FL (ref 6–12)
POTASSIUM SERPL-SCNC: 4.4 MMOL/L (ref 3.5–5.2)
PROT SERPL-MCNC: 7 G/DL (ref 6–8.5)
PROT UR QL STRIP: NEGATIVE
PSA SERPL-MCNC: 0.59 NG/ML (ref 0–4)
RBC # BLD AUTO: 5.47 10*6/MM3 (ref 4.14–5.8)
SODIUM SERPL-SCNC: 140 MMOL/L (ref 136–145)
SP GR UR STRIP: 1.03 (ref 1–1.03)
TRIGL SERPL-MCNC: 135 MG/DL (ref 0–150)
TSH SERPL DL<=0.05 MIU/L-ACNC: 1.29 UIU/ML (ref 0.27–4.2)
UROBILINOGEN UR QL STRIP: ABNORMAL
VLDLC SERPL-MCNC: 27 MG/DL (ref 5–40)
WBC # BLD AUTO: 8.94 10*3/MM3 (ref 3.4–10.8)

## 2020-08-03 PROCEDURE — 82043 UR ALBUMIN QUANTITATIVE: CPT | Performed by: INTERNAL MEDICINE

## 2020-08-03 PROCEDURE — 93000 ELECTROCARDIOGRAM COMPLETE: CPT | Performed by: INTERNAL MEDICINE

## 2020-08-03 PROCEDURE — 84443 ASSAY THYROID STIM HORMONE: CPT | Performed by: INTERNAL MEDICINE

## 2020-08-03 PROCEDURE — G0439 PPPS, SUBSEQ VISIT: HCPCS | Performed by: INTERNAL MEDICINE

## 2020-08-03 PROCEDURE — 85025 COMPLETE CBC W/AUTO DIFF WBC: CPT | Performed by: INTERNAL MEDICINE

## 2020-08-03 PROCEDURE — G0444 DEPRESSION SCREEN ANNUAL: HCPCS | Performed by: INTERNAL MEDICINE

## 2020-08-03 PROCEDURE — 83036 HEMOGLOBIN GLYCOSYLATED A1C: CPT | Performed by: INTERNAL MEDICINE

## 2020-08-03 PROCEDURE — 80053 COMPREHEN METABOLIC PANEL: CPT | Performed by: INTERNAL MEDICINE

## 2020-08-03 PROCEDURE — 86803 HEPATITIS C AB TEST: CPT | Performed by: INTERNAL MEDICINE

## 2020-08-03 PROCEDURE — 99497 ADVNCD CARE PLAN 30 MIN: CPT | Performed by: INTERNAL MEDICINE

## 2020-08-03 PROCEDURE — 80061 LIPID PANEL: CPT | Performed by: INTERNAL MEDICINE

## 2020-08-03 PROCEDURE — G0103 PSA SCREENING: HCPCS | Performed by: INTERNAL MEDICINE

## 2020-08-03 PROCEDURE — 81001 URINALYSIS AUTO W/SCOPE: CPT | Performed by: INTERNAL MEDICINE

## 2020-08-03 PROCEDURE — 82570 ASSAY OF URINE CREATININE: CPT | Performed by: INTERNAL MEDICINE

## 2020-08-03 RX ORDER — ATENOLOL 25 MG/1
12.5 TABLET ORAL DAILY
Qty: 45 TABLET | Refills: 3 | Status: SHIPPED | OUTPATIENT
Start: 2020-08-03 | End: 2021-08-07 | Stop reason: SDUPTHER

## 2020-08-03 NOTE — PROGRESS NOTES
ANNUAL WELLNESS VISIT    DRUG AND ALCOHOL USE      no alcohol use, no tobacco use and no caffeine use    DIET AND PHYSICAL ACTIVITY     Diet: general    Exercise: frequently   Exercise Details: walking    MOOD DISORDER AND COGNITIVE SCREENING   Depression Screening Tool Used yes - see PHQ-9; components reviewed with patient; 15-min counseling done -questionnaire items reviewed and he states that he feels great, putting his care in the guard.  Denies feeling depressed, hopeless, or not having pleasure in doing things.  Denies concerns with sleep, appetite, concentration, slow movements, negative thoughts.  Screening is negative for depression.   Anxiety Screening Tool Used yes     Mini-Cog Performed   Yes    1. Tell Patient 3 Words apple table thelma    2. Administer Clock Test normal    3. Recall 3 words  apple table thelma    4. Number Correct Items 3    FUNCTIONAL ABILITY AND LEVEL OF SAFETY   Hearing mild hearing loss     Wears Hearing Aids No       Current Activities Independent      none  - see Funct/Cog Status Intake     Fall Risk Assessment       Has difficulty with walking or balance  No         Timed Up and Go (TUG) Test  8 sec.       If >12 sec, normal    ADVANCED DIRECTIVE  Advance Care Planning   ACP discussion was held with the patient during this visit. Patient does not have an advance directive, information provided.     Advance Care Planning Discussion:  16 min or more spent on counseling; patient does not have advanced directive and living will.  Reviewed desires for end of life care, which is to have comfort care.  Patient does not want extraordinary life-sustaining measures, including no chest compression, shocks, intubation/ventilator use, feeding tube, or prolonged artificial life support.  Reviewed code status options, meanings, desires. Patient 's code status is DNR.   Encouraged patient to ensure family is aware of desires/preferences. States that family is far away and unsure they would be  appropriate health care surrogate. Has some tense family relationships. Patient amenable to having ACP team assist with paperwork.      PAIN SCREENING Do you have pain right now? yes      If so, 1-10 scale: 7     Constant     Do you have pain every day? Yes      Probable chronic pain: Yes  - goes to pain clinic and is on chronic narcotics    Recent Hospitalizations:  No recent hospitalization(s)..     MEDICATION REVIEW   - updated and reviewed (see Medication List).   - reviewed for potentially harmful drug-disease interactions in the elderly.   - reviewed for high risk medications in the elderly.   - aspirin use: No    BMI  Body mass index is 25.53 kg/m².    Patient's Body mass index is 25.53 kg/m². BMI is above normal parameters. Recommendations include: exercise counseling and nutrition counseling.    _________________________________________________________    Chief Complaint   Patient presents with   • Medicare Wellness-subsequent   • Hypertension       History of Present Illness  60 y.o.  gentleman presents for updated wellness visit. Thinks decreasing atenolol is helping his weight loss.  States energy is good. Reflux is stable by watching his diet; does not take medication currently.    Home BPs 130s/70s.    Reports losing his big toenail twice, most recently last year; toenail has grown out now. Denies pain.    Review of Systems  Denies headaches, visual changes, CP, palpitations, SOB, cough, abd pain, n/v/d, difficulty with urination, numbness/tingling, falls, mood changes, lightheadedness, hearing changes, rashes.     All other ROS reviewed and negative.    [facility]  The following portions of the patient's history were reviewed and updated as appropriate: allergies, current medications, past family history, past medical history, past social history, past surgical history and problem list.      Current Outpatient Medications:   •  ALPRAZolam (XANAX) 1 MG BID prn  •  aspirin-acetaminophen-caffeine  "(EXCEDRIN MIGRAINE) 250-250-65 MG prn  •  atenolol (TENORMIN) 25 MG 1/2 QD  •  fluticasone (FLONASE) 50 MCG/ACT nasal spray AD  •  loratadine (ALAVERT) 10 MG QD  •  oxyCODONE-acetaminophen (PERCOCET)  MG TID prn      VITALS:  /78   Pulse 54   Ht 170.2 cm (67\")   Wt 73.9 kg (163 lb)   SpO2 99%   BMI 25.53 kg/m²     Physical Exam   Constitutional: He is oriented to person, place, and time. He appears well-developed and well-nourished.   HENT:   Right Ear: External ear normal.   Left Ear: External ear normal.   Eyes: Pupils are equal, round, and reactive to light. Conjunctivae and EOM are normal.   Neck: Normal range of motion. Neck supple.   Cardiovascular: Normal rate, regular rhythm and normal heart sounds.   No murmur heard.  Pulmonary/Chest: Effort normal and breath sounds normal. No respiratory distress. He has no wheezes. He has no rales. He exhibits no tenderness.   Abdominal: Soft. Bowel sounds are normal. He exhibits no distension. There is no tenderness. There is no guarding.   Musculoskeletal: He exhibits no edema (BLE).   Lymphadenopathy:     He has no cervical adenopathy.   Neurological: He is alert and oriented to person, place, and time.   Skin: Skin is warm and dry. No rash noted.   Right big toenail grown out, mild ridges, nontender   Psychiatric: He has a normal mood and affect. His behavior is normal.   Nursing note and vitals reviewed.      LABS  Results for orders placed or performed in visit on 08/03/20   Comprehensive Metabolic Panel   Result Value Ref Range    Glucose 104 (H) 65 - 99 mg/dL    BUN 8 8 - 23 mg/dL    Creatinine 0.79 0.76 - 1.27 mg/dL    Sodium 140 136 - 145 mmol/L    Potassium 4.4 3.5 - 5.2 mmol/L    Chloride 106 98 - 107 mmol/L    CO2 24.7 22.0 - 29.0 mmol/L    Calcium 9.2 8.6 - 10.5 mg/dL    Total Protein 7.0 6.0 - 8.5 g/dL    Albumin 4.50 3.50 - 5.20 g/dL    ALT (SGPT) 14 1 - 41 U/L    AST (SGOT) 18 1 - 40 U/L    Alkaline Phosphatase 91 39 - 117 U/L    Total " Bilirubin 0.5 0.0 - 1.2 mg/dL    eGFR Non African Amer 100 >60 mL/min/1.73    Globulin 2.5 gm/dL    A/G Ratio 1.8 g/dL    BUN/Creatinine Ratio 10.1 7.0 - 25.0    Anion Gap 9.3 5.0 - 15.0 mmol/L   Lipid Panel   Result Value Ref Range    Total Cholesterol 154 0 - 200 mg/dL    Triglycerides 135 0 - 150 mg/dL    HDL Cholesterol 34 (L) 40 - 60 mg/dL    LDL Cholesterol  93 0 - 100 mg/dL    VLDL Cholesterol 27 5 - 40 mg/dL    LDL/HDL Ratio 2.74    TSH   Result Value Ref Range    TSH 1.290 0.270 - 4.200 uIU/mL   Hepatitis C Antibody   Result Value Ref Range    Hepatitis C Ab Non-Reactive Non-Reactive   PSA Screen   Result Value Ref Range    PSA 0.590 0.000 - 4.000 ng/mL   Hemoglobin A1c   Result Value Ref Range    Hemoglobin A1C 5.70 (H) 4.80 - 5.60 %   Microalbumin / Creatinine Urine Ratio - Urine, Clean Catch   Result Value Ref Range    Microalbumin/Creatinine Ratio      Creatinine, Urine 176.1 mg/dL    Microalbumin, Urine <1.2 mg/dL   CBC Auto Differential   Result Value Ref Range    WBC 8.94 3.40 - 10.80 10*3/mm3    RBC 5.47 4.14 - 5.80 10*6/mm3    Hemoglobin 14.9 13.0 - 17.7 g/dL    Hematocrit 45.9 37.5 - 51.0 %    MCV 83.9 79.0 - 97.0 fL    MCH 27.2 26.6 - 33.0 pg    MCHC 32.5 31.5 - 35.7 g/dL    RDW 14.1 12.3 - 15.4 %    RDW-SD 43.0 37.0 - 54.0 fl    MPV 11.6 6.0 - 12.0 fL    Platelets 236 140 - 450 10*3/mm3    Neutrophil % 77.9 (H) 42.7 - 76.0 %    Lymphocyte % 14.1 (L) 19.6 - 45.3 %    Monocyte % 7.0 5.0 - 12.0 %    Eosinophil % 0.4 0.3 - 6.2 %    Basophil % 0.4 0.0 - 1.5 %    Immature Grans % 0.2 0.0 - 0.5 %    Neutrophils, Absolute 6.95 1.70 - 7.00 10*3/mm3    Lymphocytes, Absolute 1.26 0.70 - 3.10 10*3/mm3    Monocytes, Absolute 0.63 0.10 - 0.90 10*3/mm3    Eosinophils, Absolute 0.04 0.00 - 0.40 10*3/mm3    Basophils, Absolute 0.04 0.00 - 0.20 10*3/mm3    Immature Grans, Absolute 0.02 0.00 - 0.05 10*3/mm3    nRBC 0.0 0.0 - 0.2 /100 WBC   Urinalysis without microscopic (no culture) - Urine, Clean Catch   Result  Value Ref Range    Color, UA Yellow Yellow, Straw    Appearance, UA Turbid (A) Clear    pH, UA <=5.0 5.0 - 8.0    Specific Gravity, UA 1.026 1.005 - 1.030    Glucose, UA Negative Negative    Ketones, UA Negative Negative    Bilirubin, UA Negative Negative    Blood, UA Negative Negative    Protein, UA Negative Negative    Leuk Esterase, UA Negative Negative    Nitrite, UA Negative Negative    Urobilinogen, UA 0.2 E.U./dL 0.2 - 1.0 E.U./dL   Urinalysis, Microscopic Only - Urine, Clean Catch   Result Value Ref Range    RBC, UA 0-2 None Seen, 0-2 /HPF    WBC, UA 0-2 None Seen, 0-2 /HPF    Bacteria, UA None Seen None Seen /HPF    Squamous Epithelial Cells, UA 0-2 None Seen, 0-2 /HPF    Hyaline Casts, UA 0-2 None Seen /LPF    Mucus, UA Moderate/2+ (A) None Seen, Trace /HPF    Methodology Manual Light Microscopy     Observations, UA Hippuric Acid Crystals  3+ None Seen /HPF       ECG 12 Lead  Date/Time: 8/3/2020 1:54 PM  Performed by: Vilma Brown MD  Authorized by: Vilma Brown MD   Comparison: compared with previous ECG from 8/2/2019  Similar to previous ECG  Rhythm: sinus rhythm and sinus bradycardia  Rate: normal  BPM: 54  Conduction: conduction normal  ST Segments: ST segments normal  T Waves: T waves normal  T inversion: III (old)  QRS axis: normal  Clinical impression comment: stable EKG                ASSESSMENT/PLAN  Problem List Items Addressed This Visit        Cardiovascular and Mediastinum    Hypertension     BP bord/stable on atenolol 25mg 1/2 QD - change to #45, 3RF         Relevant Medications    atenolol (TENORMIN) 25 MG tablet    Hyperlipidemia     Update lipids with goal LDL < 130, goal TG < 150; no meds; ADDENDUM: lipids at goal with LDL 93 and , no meds         Familial hypertriglyceridemia     Update lipids with goal LDL < 130, goal TG < 150; no meds; ADDENDUM: TGs very good at 135, no meds            Digestive    GERD (gastroesophageal reflux disease)     Asx; states he just monitors what  he eats; no meds            Endocrine    Type 2 diabetes mellitus with hyperglycemia, without long-term current use of insulin (CMS/[facility])     Update A1C; ADDENDUM: stable/improved A1C 5.7; encouraged reg phys activity to decr insulin resistance, moderation in unhealthy starches/sweets; f/u A1C in 12 mos              Other    Medicare annual wellness visit, subsequent - Primary     Health maintenance - counseling given and patient refuses all vaccines; Tdap 7/11 (Next Td due 2021 if patient willing); counseling re: colon CA and prostate CA screening and rationale for screening for early detection/intervention - patient refuses all screening; rec updated eye exam (yrs ago); rec updated dental exam patient declines); (+) seat belt use    Consultants:  Pain clinic - name unknown           Relevant Orders    Ambulatory Referral to Advance Care Planning          FOLLOW-UP  RTC 1yr for annual wellness; fasting labs the week prior to appt (CBC, CMP, TSH, lipids, UA/micro, PSA, A1C, microalb)      Electronically signed by:    Vilma Brown MD, FACP  08/03/2020

## 2020-08-03 NOTE — TELEPHONE ENCOUNTER
Hub to read:     [facility] for patient he is scheduled for a wellness visit today and he needs to come into the office.    He does not have mychart and Dr. Brown has requested him come in for refills.

## 2020-08-04 LAB
BACTERIA UR QL AUTO: ABNORMAL /HPF
HYALINE CASTS UR QL AUTO: ABNORMAL /LPF
MUCOUS THREADS URNS QL MICRO: ABNORMAL /HPF
OTHER OBSERVATIONS IN URINE MICRO: ABNORMAL /HPF
RBC # UR: ABNORMAL /HPF
REF LAB TEST METHOD: ABNORMAL
SQUAMOUS #/AREA URNS HPF: ABNORMAL /HPF
WBC UR QL AUTO: ABNORMAL /HPF

## 2020-08-05 NOTE — PROGRESS NOTES
Dear Francisco,    Thank you for obtaining the laboratories that we ordered.  I have received those results and would like to review them with you.    Your blood counts, thyroid test, electrolytes, and urine test are within normal limits.  This indicates no anemia, as well as normal thyroid, liver, and kidney function. Your PSA, the prostate blood test, is normal at 0.590 (normal < 4.0. Fasting sugar is borderline at 104 (normal < 100), and your 3-month average of blood sugars remains acceptable at 5.7 (normal < 5.7). This means your pre-diabetes is stable.    Your cholesterol profile is great, showing a total cholesterol of 154 (goal < 200).  Your triglycerides are good at 135 with a goal < 150.  Your HDL, the good cholesterol, is borderline at 34.  HDL is heart protective, and the goal is > 40 in men.  Your LDL, the bad cholesterol, is 93.  Goal for LDL is < 130, ideally < 100.    Overall your labs are stable.  Keep up the good work and stay healthy!      Sincerely,  Vilma Brown MD, FACP

## 2020-09-01 ENCOUNTER — TELEPHONE (OUTPATIENT)
Dept: INTERNAL MEDICINE | Facility: CLINIC | Age: 60
End: 2020-09-01

## 2020-09-01 NOTE — TELEPHONE ENCOUNTER
Patient is calling about obtaining a paternity test.  Patient isn't sure where to go... Patient would l fritz to know more information on this.. Patient would like a call back.. Please advise        Francisco Waggoner call back [phone]

## 2020-09-01 NOTE — TELEPHONE ENCOUNTER
Spoke with pt, he is needing DNA test showing his daughter is his for his biological fathers Last Will and Testament. Pt is wanting the cheapest way showing proof so his dad can make changes to the Will. Please advise on where/which tests can be done.

## 2020-09-02 NOTE — TELEPHONE ENCOUNTER
pls call the lab and see whether they do paternity testing.    I see something in epic but I believe he and his daughter would both need to be tested

## 2020-09-04 NOTE — TELEPHONE ENCOUNTER
Can you help find this out? I tried calling Valeria and she wasn't sure and I couldn't get anyone on the phone at the lab number she told me to call.

## 2020-09-14 NOTE — TELEPHONE ENCOUNTER
Spoke with Lab, ([facility]) stated that [facility] does not do paternity testing. Pt may check with state lab to see if they would be able to help.

## 2020-11-17 ENCOUNTER — TELEPHONE (OUTPATIENT)
Dept: INTERNAL MEDICINE | Facility: CLINIC | Age: 60
End: 2020-11-17

## 2020-11-17 NOTE — TELEPHONE ENCOUNTER
PATIENT WANTED TO LET YOU KNOW THEN HAPPY THANKSGIMARQUIS, BRIDGETT CARPENTER..AND HE LOVES ALL OF YOU :)

## 2021-03-24 ENCOUNTER — PATIENT OUTREACH (OUTPATIENT)
Dept: CASE MANAGEMENT | Facility: OTHER | Age: 61
End: 2021-03-24

## 2021-03-24 ENCOUNTER — TELEPHONE (OUTPATIENT)
Dept: CASE MANAGEMENT | Facility: OTHER | Age: 61
End: 2021-03-24

## 2021-03-24 NOTE — TELEPHONE ENCOUNTER
"I provided assistance with Advanced Care Planning to Mr. Waggoner per Primary Care Provider's request. Patient stated he is a \"DNR\", Do Not Resuscitate; and clarified if he dies he does not want CPR.  He stated the Living Will has been on his agenda, he just has not gotten to it.  Offered to mail Mr. Waggoner an Advanced Directive packet with a Living Will form in it, and explained we are available to help complete the form by phone, or answer any questions. Mr. Waggoner voiced agreement to the mailing.  He did not want to set up a follow up call.  He said if he has questions, he will call.  "

## 2021-03-24 NOTE — OUTREACH NOTE
"Patient Outreach Note    I provided assistance with Advanced Care Planning to Mr. Waggoner per Primary Care Provider's request. Patient stated he is a \"DNR\", Do Not Resuscitate; and clarified if he dies he does not want CPR.  He stated the Living Will has been on his agenda, he just has not gotten to it.  Offered to mail Mr. Waggoner an Advanced Directive packet with a Living Will form in it, and explained we are available to help complete the form by phone, or answer any questions. Mr. Waggoner voiced agreement to the mailing.  He did not want to set up a follow up call.  He said if he has questions, he will call.  This note will be routed to the PCP.    Lakeshia Covarrubias RN  Ambulatory     3/24/2021, 16:07 EDT      "

## 2021-05-11 ENCOUNTER — TELEPHONE (OUTPATIENT)
Dept: INTERNAL MEDICINE | Facility: CLINIC | Age: 61
End: 2021-05-11

## 2021-05-11 NOTE — TELEPHONE ENCOUNTER
PATIENT CALLED AND WANTED TO UPDATE HIS PHARMACY; HE SAID THIS IS CLOSER TO HIS HOME; UPDATED HIS POLICY    [facility] [place]

## 2021-08-02 ENCOUNTER — TELEPHONE (OUTPATIENT)
Dept: INTERNAL MEDICINE | Facility: CLINIC | Age: 61
End: 2021-08-02

## 2021-08-02 ENCOUNTER — PATIENT OUTREACH (OUTPATIENT)
Dept: CASE MANAGEMENT | Facility: OTHER | Age: 61
End: 2021-08-02

## 2021-08-02 DIAGNOSIS — E11.65 TYPE 2 DIABETES MELLITUS WITH HYPERGLYCEMIA, WITHOUT LONG-TERM CURRENT USE OF INSULIN (HCC): ICD-10-CM

## 2021-08-02 DIAGNOSIS — Z12.5 SCREENING FOR PROSTATE CANCER: ICD-10-CM

## 2021-08-02 DIAGNOSIS — I10 ESSENTIAL HYPERTENSION: ICD-10-CM

## 2021-08-02 DIAGNOSIS — Z00.00 MEDICARE ANNUAL WELLNESS VISIT, SUBSEQUENT: Primary | ICD-10-CM

## 2021-08-02 DIAGNOSIS — E78.2 MIXED HYPERLIPIDEMIA: ICD-10-CM

## 2021-08-02 NOTE — OUTREACH NOTE
Ambulatory Case Management Note    Patient Outreach    Patient called RN-ACM, asking if he could come to Dr. Brown's (PCP) office tomorrow to get his annual lab-work done, as he has an appt (AWV) with Dr. Brown next week, 8-9-21.  A 3-way call was made with patient to Dr. Brown's office, spoke with Zakiya in scheduling.  Zakiya provided patient the lab hours, 8am - 4:30pm, confirming the lab is open within those hours.  She told patient she did not see labs ordered yet, but she would send a request to PCP to place lab orders in system, for patient to get his lab-work done ahead of appt.  Patient said he lives in [facility], and has to drive in to [place] tomorrow for other things; was hoping to be able to get his lab-work done tomorrow; said he knows he needs to be fasting; he prefers not to have to drive to [place] another day this week.  Advised patient to call Dr. Brown's office in the morning and confirm that lab orders are in the [facility], before he gets in his car.  Patient said he does have Dr. Brown's office phone # and will call them in the morning.  No other questions or concerns voiced at this time.        Lakeshia Covarrubias RN  Ambulatory Case Management    8/2/2021, 10:42 EDT

## 2021-08-02 NOTE — TELEPHONE ENCOUNTER
Caller: Francisco Waggoner    Relationship: Self    Best call back number: [phone]    What orders are you requesting (i.e. lab or imaging): LAB IF POSSIBLE    In what timeframe would the patient need to come in: BEFORE TOMORROW     Where will you receive your lab/imaging services: LAB

## 2021-08-02 NOTE — TELEPHONE ENCOUNTER
Left VM to let patient know that his labs have been ordered and he can stop by anytime to have those done, fasting.

## 2021-08-02 NOTE — TELEPHONE ENCOUNTER
PT IS REQUESTING TO HAVE FASTING LABS FOR HIS WELLNESS VISIT COMPLETED TOMORROW 08/03/2. PLEASE ADVISE.

## 2021-08-03 ENCOUNTER — LAB (OUTPATIENT)
Dept: LAB | Facility: HOSPITAL | Age: 61
End: 2021-08-03

## 2021-08-03 DIAGNOSIS — Z00.00 MEDICARE ANNUAL WELLNESS VISIT, SUBSEQUENT: ICD-10-CM

## 2021-08-03 DIAGNOSIS — Z12.5 SCREENING FOR PROSTATE CANCER: ICD-10-CM

## 2021-08-03 DIAGNOSIS — E78.2 MIXED HYPERLIPIDEMIA: ICD-10-CM

## 2021-08-03 DIAGNOSIS — E11.65 TYPE 2 DIABETES MELLITUS WITH HYPERGLYCEMIA, WITHOUT LONG-TERM CURRENT USE OF INSULIN (HCC): ICD-10-CM

## 2021-08-03 DIAGNOSIS — I10 ESSENTIAL HYPERTENSION: ICD-10-CM

## 2021-08-03 LAB
BACTERIA UR QL AUTO: NORMAL /HPF
BASOPHILS # BLD AUTO: 0.05 10*3/MM3 (ref 0–0.2)
BASOPHILS NFR BLD AUTO: 0.7 % (ref 0–1.5)
BILIRUB UR QL STRIP: NEGATIVE
CLARITY UR: CLEAR
COLOR UR: YELLOW
DEPRECATED RDW RBC AUTO: 42.4 FL (ref 37–54)
EOSINOPHIL # BLD AUTO: 0.21 10*3/MM3 (ref 0–0.4)
EOSINOPHIL NFR BLD AUTO: 3 % (ref 0.3–6.2)
ERYTHROCYTE [DISTWIDTH] IN BLOOD BY AUTOMATED COUNT: 13.7 % (ref 12.3–15.4)
GLUCOSE UR STRIP-MCNC: NEGATIVE MG/DL
HCT VFR BLD AUTO: 49.3 % (ref 37.5–51)
HGB BLD-MCNC: 16 G/DL (ref 13–17.7)
HGB UR QL STRIP.AUTO: NEGATIVE
HYALINE CASTS UR QL AUTO: NORMAL /LPF
IMM GRANULOCYTES # BLD AUTO: 0.03 10*3/MM3 (ref 0–0.05)
IMM GRANULOCYTES NFR BLD AUTO: 0.4 % (ref 0–0.5)
KETONES UR QL STRIP: NEGATIVE
LEUKOCYTE ESTERASE UR QL STRIP.AUTO: NEGATIVE
LYMPHOCYTES # BLD AUTO: 1.54 10*3/MM3 (ref 0.7–3.1)
LYMPHOCYTES NFR BLD AUTO: 21.7 % (ref 19.6–45.3)
MCH RBC QN AUTO: 27.5 PG (ref 26.6–33)
MCHC RBC AUTO-ENTMCNC: 32.5 G/DL (ref 31.5–35.7)
MCV RBC AUTO: 84.9 FL (ref 79–97)
MONOCYTES # BLD AUTO: 0.65 10*3/MM3 (ref 0.1–0.9)
MONOCYTES NFR BLD AUTO: 9.2 % (ref 5–12)
NEUTROPHILS NFR BLD AUTO: 4.62 10*3/MM3 (ref 1.7–7)
NEUTROPHILS NFR BLD AUTO: 65 % (ref 42.7–76)
NITRITE UR QL STRIP: NEGATIVE
NRBC BLD AUTO-RTO: 0 /100 WBC (ref 0–0.2)
PH UR STRIP.AUTO: 5.5 [PH] (ref 5–8)
PLATELET # BLD AUTO: 238 10*3/MM3 (ref 140–450)
PMV BLD AUTO: 11.4 FL (ref 6–12)
PROT UR QL STRIP: NEGATIVE
RBC # BLD AUTO: 5.81 10*6/MM3 (ref 4.14–5.8)
RBC # UR: NORMAL /HPF
REF LAB TEST METHOD: NORMAL
SP GR UR STRIP: 1.02 (ref 1–1.03)
SQUAMOUS #/AREA URNS HPF: NORMAL /HPF
UROBILINOGEN UR QL STRIP: NORMAL
WBC # BLD AUTO: 7.1 10*3/MM3 (ref 3.4–10.8)
WBC UR QL AUTO: NORMAL /HPF

## 2021-08-03 PROCEDURE — 82570 ASSAY OF URINE CREATININE: CPT

## 2021-08-03 PROCEDURE — 80053 COMPREHEN METABOLIC PANEL: CPT

## 2021-08-03 PROCEDURE — 83036 HEMOGLOBIN GLYCOSYLATED A1C: CPT

## 2021-08-03 PROCEDURE — G0103 PSA SCREENING: HCPCS

## 2021-08-03 PROCEDURE — 81001 URINALYSIS AUTO W/SCOPE: CPT

## 2021-08-03 PROCEDURE — 82043 UR ALBUMIN QUANTITATIVE: CPT

## 2021-08-03 PROCEDURE — 80061 LIPID PANEL: CPT

## 2021-08-03 PROCEDURE — 85025 COMPLETE CBC W/AUTO DIFF WBC: CPT

## 2021-08-03 PROCEDURE — 84443 ASSAY THYROID STIM HORMONE: CPT

## 2021-08-04 LAB
ALBUMIN SERPL-MCNC: 4.9 G/DL (ref 3.5–5.2)
ALBUMIN UR-MCNC: <1.2 MG/DL
ALBUMIN/GLOB SERPL: 1.9 G/DL
ALP SERPL-CCNC: 96 U/L (ref 39–117)
ALT SERPL W P-5'-P-CCNC: 15 U/L (ref 1–41)
ANION GAP SERPL CALCULATED.3IONS-SCNC: 14.4 MMOL/L (ref 5–15)
AST SERPL-CCNC: 23 U/L (ref 1–40)
BILIRUB SERPL-MCNC: 0.4 MG/DL (ref 0–1.2)
BUN SERPL-MCNC: 10 MG/DL (ref 8–23)
BUN/CREAT SERPL: 12.7 (ref 7–25)
CALCIUM SPEC-SCNC: 9.6 MG/DL (ref 8.6–10.5)
CHLORIDE SERPL-SCNC: 107 MMOL/L (ref 98–107)
CHOLEST SERPL-MCNC: 184 MG/DL (ref 0–200)
CO2 SERPL-SCNC: 22.6 MMOL/L (ref 22–29)
CREAT SERPL-MCNC: 0.79 MG/DL (ref 0.76–1.27)
CREAT UR-MCNC: 140.7 MG/DL
GFR SERPL CREATININE-BSD FRML MDRD: 100 ML/MIN/1.73
GLOBULIN UR ELPH-MCNC: 2.6 GM/DL
GLUCOSE SERPL-MCNC: 111 MG/DL (ref 65–99)
HBA1C MFR BLD: 5.6 % (ref 4.8–5.6)
HDLC SERPL-MCNC: 29 MG/DL (ref 40–60)
LDLC SERPL CALC-MCNC: 120 MG/DL (ref 0–100)
LDLC/HDLC SERPL: 4 {RATIO}
MICROALBUMIN/CREAT UR: NORMAL MG/G{CREAT}
POTASSIUM SERPL-SCNC: 4.9 MMOL/L (ref 3.5–5.2)
PROT SERPL-MCNC: 7.5 G/DL (ref 6–8.5)
PSA SERPL-MCNC: 0.5 NG/ML (ref 0–4)
SODIUM SERPL-SCNC: 144 MMOL/L (ref 136–145)
TRIGL SERPL-MCNC: 195 MG/DL (ref 0–150)
TSH SERPL DL<=0.05 MIU/L-ACNC: 1.55 UIU/ML (ref 0.27–4.2)
VLDLC SERPL-MCNC: 35 MG/DL (ref 5–40)

## 2021-08-07 NOTE — ASSESSMENT & PLAN NOTE
Lipids worsened with ; goal LDL < 70; unable to tolerate any chol meds; cont working on diet and exercise; repeat lipids 12 mos

## 2021-08-07 NOTE — ASSESSMENT & PLAN NOTE
BG control remains stable with A1C 5.6; on no DM meds; encouraged reg phys activity to decr insulin resistance, moderation in unhealthy starches/sweets; f/u A1C in 12 mos

## 2021-08-07 NOTE — PROGRESS NOTES
ANNUAL WELLNESS VISIT    DRUG AND ALCOHOL USE      no alcohol use, no tobacco use and caffeine intake: 1 cans/bottles of caffeinated soda pop per week    DIET AND PHYSICAL ACTIVITY     Diet: general    Exercise: daily   Exercise Details: walking    MOOD DISORDER AND COGNITIVE SCREENING   Depression Screening Tool Used yes - see PHQ-9; components reviewed with patient; reports several days of feeling down, hopeless, blue, and less interest in activities as well as decreased concentration. Reports most days with sleep issues and correlating decreased energy. Denies concerns with negative thoughts, appetite, slow movements, slow thoughts. Is followed by pain clinic on chronic narcotics. Screening is neg for active depression.   Anxiety Screening Tool Used yes     Mini-Cog Performed   Yes    1. Tell Patient 3 Words car tie pen    2. Administer Clock Test normal    3. Recall 3 words  car tie pen    4. Number Correct Items 3    FUNCTIONAL ABILITY AND LEVEL OF SAFETY   Hearing mild hearing loss     Wears Hearing Aids No       Current Activities Independent      none  - see Funct/Cog Status Intake     Fall Risk Assessment       Has difficulty with walking or balance  No         Timed Up and Go (TUG) Test  9 sec.       If >12 sec, normal    ADVANCED DIRECTIVE  Advance Care Planning   ACP discussion was held with the patient during this visit. Patient does not have an advance directive, information provided.     Advance Care Planning Discussion:  16 min or more spent on counseling; patient does not have advanced directive and living will.  Reviewed desires for end of life care, which is to have comfort care.  Patient does not want extraordinary life-sustaining measures, including no chest compression, shocks, intubation/ventilator use, feeding tube, or prolonged artificial life support.  Reviewed code status options, meanings, desires. Patient 's code status is DNR.   Encouraged patient to ensure family is aware of  desires/preferences. He already has the ACP pamphlet and MOST form provided today.    PAIN SCREENING Do you have pain right now? yes      If so, 1-10 scale: 8     Constant     Do you have pain every day? Yes      Probable chronic pain: Yes     Recent Hospitalizations:  No recent hospitalization(s)..     MEDICATION REVIEW   - updated and reviewed (see Medication List).   - reviewed for potentially harmful drug-disease interactions in the elderly.   - reviewed for high risk medications in the elderly.   - aspirin use: No    BMI  Body mass index is 26.46 kg/m².    Patient's Body mass index is 26.46 kg/m². indicating that he is overweight (BMI 25-29.9). Obesity-related health conditions include the following: hypertension, diabetes mellitus, dyslipidemias, GERD, osteoarthritis and chr pain syndrome. Obesity is worsening. BMI is is above average; BMI management plan is completed. We discussed portion control and increasing exercise..    _________________________________________________________    Chief Complaint   Patient presents with   • Medicare Wellness-subsequent   • Hypertension       History of Present Illness  61 y.o.  gentleman presents for updated wellness visit as well as f/u on cholesterol and BP. Daughter who lives with him got a good job and has moved to [facility]. Other daughter and her children are in [place] (got full scholarships to college).    Having occ episodes where left ear feels muffled, resolved when he puts his head between knees and stands upright, but then sometimes sxs recur when swallowing; comes and goes without ear pain, hearing loss, and right ear unaffected.    Review of Systems  Denies headaches, visual changes, CP, palpitations, SOB, cough, abd pain, n/v/d, difficulty with urination, numbness/tingling, falls, mood changes, lightheadedness, hearing changes, rashes.    ROS (+) for episodes of muffled left ear, resolved with bending over and standing back upright.     All other  "ROS reviewed and negative.    Current Outpatient Medications:   •  ALPRAZolam (XANAX) 1 MG prn  •  aspirin-acetaminophen-caffeine (EXCEDRIN MIGRAINE) 250-250-65 MG prn  •  atenolol (TENORMIN) 25 MG 1/2 QD  •  fluticasone (FLONASE) 50 MCG/ACT nasal spray AD  •  loratadine (ALAVERT) 10 MG QD  •  oxyCODONE-acetaminophen (PERCOCET)  MGTID prn      VITALS:  /78   Pulse 73   Ht 165.1 cm (65\")   Wt 72.1 kg (159 lb)   SpO2 98%   BMI 26.46 kg/m²     Physical Exam  Vitals and nursing note reviewed.   Constitutional:       General: He is not in acute distress.     Appearance: Normal appearance. He is well-developed.   HENT:      Head: Normocephalic.      Right Ear: Tympanic membrane, ear canal and external ear normal.      Left Ear: Tympanic membrane, ear canal and external ear normal.      Ears:      Comments: bilat TMs intact with minimal fluid posteriorly     Nose: Nose normal.   Eyes:      Extraocular Movements: Extraocular movements intact.      Conjunctiva/sclera: Conjunctivae normal.      Pupils: Pupils are equal, round, and reactive to light.   Neck:      Vascular: No carotid bruit (bilaterally).   Cardiovascular:      Rate and Rhythm: Regular rhythm. Bradycardia present.      Heart sounds: Normal heart sounds.   Pulmonary:      Effort: Pulmonary effort is normal. No respiratory distress.      Breath sounds: Normal breath sounds. No wheezing or rales.   Abdominal:      General: Bowel sounds are normal. There is no distension.      Palpations: Abdomen is soft. There is no mass.      Tenderness: There is no abdominal tenderness.   Musculoskeletal:      Cervical back: Normal range of motion and neck supple.      Right lower leg: No edema.   Lymphadenopathy:      Cervical: No cervical adenopathy.   Skin:     General: Skin is warm and dry.      Findings: No rash.   Neurological:      Mental Status: He is alert and oriented to person, place, and time.      Cranial Nerves: No cranial nerve deficit.      Deep " Tendon Reflexes: Reflexes normal.   Psychiatric:         Mood and Affect: Mood normal.         Behavior: Behavior normal.           LABS  Results for orders placed or performed in visit on 08/03/21   Comprehensive Metabolic Panel    Specimen: Blood   Result Value Ref Range    Glucose 111 (H) 65 - 99 mg/dL    BUN 10 8 - 23 mg/dL    Creatinine 0.79 0.76 - 1.27 mg/dL    Sodium 144 136 - 145 mmol/L    Potassium 4.9 3.5 - 5.2 mmol/L    Chloride 107 98 - 107 mmol/L    CO2 22.6 22.0 - 29.0 mmol/L    Calcium 9.6 8.6 - 10.5 mg/dL    Total Protein 7.5 6.0 - 8.5 g/dL    Albumin 4.90 3.50 - 5.20 g/dL    ALT (SGPT) 15 1 - 41 U/L    AST (SGOT) 23 1 - 40 U/L    Alkaline Phosphatase 96 39 - 117 U/L    Total Bilirubin 0.4 0.0 - 1.2 mg/dL    eGFR Non African Amer 100 >60 mL/min/1.73    Globulin 2.6 gm/dL    A/G Ratio 1.9 g/dL    BUN/Creatinine Ratio 12.7 7.0 - 25.0    Anion Gap 14.4 5.0 - 15.0 mmol/L   Lipid Panel    Specimen: Blood   Result Value Ref Range    Total Cholesterol 184 0 - 200 mg/dL    Triglycerides 195 (H) 0 - 150 mg/dL    HDL Cholesterol 29 (L) 40 - 60 mg/dL    LDL Cholesterol  120 (H) 0 - 100 mg/dL    VLDL Cholesterol 35 5 - 40 mg/dL    LDL/HDL Ratio 4.00    TSH    Specimen: Blood   Result Value Ref Range    TSH 1.550 0.270 - 4.200 uIU/mL   Microalbumin / Creatinine Urine Ratio - Urine, Clean Catch    Specimen: Urine, Clean Catch   Result Value Ref Range    Microalbumin/Creatinine Ratio      Creatinine, Urine 140.7 mg/dL    Microalbumin, Urine <1.2 mg/dL   Hemoglobin A1c    Specimen: Blood   Result Value Ref Range    Hemoglobin A1C 5.60 4.80 - 5.60 %   PSA Screen    Specimen: Blood   Result Value Ref Range    PSA 0.497 0.000 - 4.000 ng/mL   CBC Auto Differential    Specimen: Blood   Result Value Ref Range    WBC 7.10 3.40 - 10.80 10*3/mm3    RBC 5.81 (H) 4.14 - 5.80 10*6/mm3    Hemoglobin 16.0 13.0 - 17.7 g/dL    Hematocrit 49.3 37.5 - 51.0 %    MCV 84.9 79.0 - 97.0 fL    MCH 27.5 26.6 - 33.0 pg    MCHC 32.5 31.5 -  35.7 g/dL    RDW 13.7 12.3 - 15.4 %    RDW-SD 42.4 37.0 - 54.0 fl    MPV 11.4 6.0 - 12.0 fL    Platelets 238 140 - 450 10*3/mm3    Neutrophil % 65.0 42.7 - 76.0 %    Lymphocyte % 21.7 19.6 - 45.3 %    Monocyte % 9.2 5.0 - 12.0 %    Eosinophil % 3.0 0.3 - 6.2 %    Basophil % 0.7 0.0 - 1.5 %    Immature Grans % 0.4 0.0 - 0.5 %    Neutrophils, Absolute 4.62 1.70 - 7.00 10*3/mm3    Lymphocytes, Absolute 1.54 0.70 - 3.10 10*3/mm3    Monocytes, Absolute 0.65 0.10 - 0.90 10*3/mm3    Eosinophils, Absolute 0.21 0.00 - 0.40 10*3/mm3    Basophils, Absolute 0.05 0.00 - 0.20 10*3/mm3    Immature Grans, Absolute 0.03 0.00 - 0.05 10*3/mm3    nRBC 0.0 0.0 - 0.2 /100 WBC   Urinalysis without microscopic (no culture) - Urine, Clean Catch    Specimen: Urine, Clean Catch   Result Value Ref Range    Color, UA Yellow Yellow, Straw    Appearance, UA Clear Clear    pH, UA 5.5 5.0 - 8.0    Specific Gravity, UA 1.021 1.005 - 1.030    Glucose, UA Negative Negative    Ketones, UA Negative Negative    Bilirubin, UA Negative Negative    Blood, UA Negative Negative    Protein, UA Negative Negative    Leuk Esterase, UA Negative Negative    Nitrite, UA Negative Negative    Urobilinogen, UA 0.2 E.U./dL 0.2 - 1.0 E.U./dL   Urinalysis, Microscopic Only - Urine, Clean Catch    Specimen: Urine, Clean Catch   Result Value Ref Range    RBC, UA 0-2 None Seen, 0-2 /HPF    WBC, UA 0-2 None Seen, 0-2 /HPF    Bacteria, UA None Seen None Seen /HPF    Squamous Epithelial Cells, UA 0-2 None Seen, 0-2 /HPF    Hyaline Casts, UA None Seen None Seen /LPF    Methodology Automated Microscopy      7/20 A1C 5.7      ECG 12 Lead    Date/Time: 8/9/2021 10:30 AM  Performed by: Vilma Brown MD  Authorized by: Vilma Brown MD   Comparison: compared with previous ECG from 8/3/2020  Similar to previous ECG  Rhythm: sinus rhythm and sinus bradycardia  Rate: normal  BPM: 54  Conduction: conduction normal  ST Segments: ST segments normal  T Waves: T waves normal  QRS axis:  normal  Clinical impression comment: stable EKG              ASSESSMENT/PLAN    Diagnoses and all orders for this visit:    1. Medicare annual wellness visit, subsequent (Primary)  Assessment & Plan:  Health maintenance - counseling given and patient refuses all vaccines, incl flu, PVX, Shingrix, HAV; Tdap 7/11 - rec updated Td but he declines; counseling also re: COVID19 vaccine, which he also refuses; counseling re: colon CA and prostate CA screening and rationale for screening for early detection/intervention - patient refuses all screening; eye exam recommended; dental exam needs to be done per pt but had prev bad experience; (+) seat belt use    Consultants:  Patient Care Team:  Vilma Brown MD as PCP - General (Internal Medicine)  Megan Barker MD as Consulting Physician (Physical Medicine and Rehabilitation)  Gasper Childress II, MD as Consulting Physician (Pain Medicine)          2. Hypertension  Assessment & Plan:  BP bord elevated 134/78 with goal <130/80; cont atenolol 25mg 1/2 QD #45, 3RF; cont to encourage reg phys activity and low Na diet    Orders:  -     ECG 12 Lead  -     atenolol (TENORMIN) 25 MG tablet; Take 0.5 tablets by mouth Daily.  Dispense: 45 tablet; Refill: 3    3. Type 2 diabetes mellitus with hyperglycemia, without long-term current use of insulin (CMS/[facility])  Assessment & Plan:  BG control remains stable with A1C 5.6; on no DM meds; encouraged reg phys activity to decr insulin resistance, moderation in unhealthy starches/sweets; f/u A1C in 12 mos        4. Familial hypertriglyceridemia  Assessment & Plan:  TGs are elevated but 195 is acceptable for this patient; decrease saturated fats and cholesterol in the diet; repeat lipids in 12 mos        5. Hyperlipidemia  Assessment & Plan:  Lipids worsened with ; goal LDL < 70; unable to tolerate any chol meds; cont working on diet and exercise; repeat lipids 12 mos        FOLLOW-UP  RTC 1yr for annual wellness; fasting labs the  week prior to appt (CBC, CMP, TSH, lipids, UA/micro, PSA, A1C, microalb)      Electronically signed by:    Vilma Brown MD, FACP  08/09/2021

## 2021-08-07 NOTE — ASSESSMENT & PLAN NOTE
BP bord elevated 134/78 with goal <130/80; cont atenolol 25mg 1/2 QD #45, 3RF; cont to encourage reg phys activity and low Na diet

## 2021-08-07 NOTE — ASSESSMENT & PLAN NOTE
TGs are elevated but 195 is acceptable for this patient; decrease saturated fats and cholesterol in the diet; repeat lipids in 12 mos

## 2021-08-07 NOTE — ASSESSMENT & PLAN NOTE
Health maintenance - counseling given and patient refuses all vaccines, incl flu, PVX, Shingrix, HAV; Tdap 7/11 - rec updated Td but he declines; counseling also re: COVID19 vaccine, which he also refuses; counseling re: colon CA and prostate CA screening and rationale for screening for early detection/intervention - patient refuses all screening; eye exam recommended; dental exam needs to be done per pt but had prev bad experience; (+) seat belt use    Consultants:  Patient Care Team:  Vilma Brown MD as PCP - General (Internal Medicine)  Megan Barker MD as Consulting Physician (Physical Medicine and Rehabilitation)  Gasper Childress II, MD as Consulting Physician (Pain Medicine)

## 2021-08-09 ENCOUNTER — TELEPHONE (OUTPATIENT)
Dept: INTERNAL MEDICINE | Facility: CLINIC | Age: 61
End: 2021-08-09

## 2021-08-09 ENCOUNTER — OFFICE VISIT (OUTPATIENT)
Dept: INTERNAL MEDICINE | Facility: CLINIC | Age: 61
End: 2021-08-09

## 2021-08-09 VITALS
WEIGHT: 159 LBS | BODY MASS INDEX: 26.49 KG/M2 | HEART RATE: 73 BPM | HEIGHT: 65 IN | OXYGEN SATURATION: 98 % | SYSTOLIC BLOOD PRESSURE: 134 MMHG | DIASTOLIC BLOOD PRESSURE: 78 MMHG

## 2021-08-09 DIAGNOSIS — Z00.00 MEDICARE ANNUAL WELLNESS VISIT, SUBSEQUENT: Primary | ICD-10-CM

## 2021-08-09 DIAGNOSIS — E78.1 FAMILIAL HYPERTRIGLYCERIDEMIA: Chronic | ICD-10-CM

## 2021-08-09 DIAGNOSIS — E11.65 TYPE 2 DIABETES MELLITUS WITH HYPERGLYCEMIA, WITHOUT LONG-TERM CURRENT USE OF INSULIN (HCC): Chronic | ICD-10-CM

## 2021-08-09 DIAGNOSIS — E78.2 MIXED HYPERLIPIDEMIA: Chronic | ICD-10-CM

## 2021-08-09 DIAGNOSIS — I10 ESSENTIAL HYPERTENSION: ICD-10-CM

## 2021-08-09 PROBLEM — Z71.89 DNR (DO NOT RESUSCITATE) DISCUSSION: Status: ACTIVE | Noted: 2021-08-09

## 2021-08-09 PROCEDURE — 1170F FXNL STATUS ASSESSED: CPT | Performed by: INTERNAL MEDICINE

## 2021-08-09 PROCEDURE — 99214 OFFICE O/P EST MOD 30 MIN: CPT | Performed by: INTERNAL MEDICINE

## 2021-08-09 PROCEDURE — G0439 PPPS, SUBSEQ VISIT: HCPCS | Performed by: INTERNAL MEDICINE

## 2021-08-09 PROCEDURE — 93000 ELECTROCARDIOGRAM COMPLETE: CPT | Performed by: INTERNAL MEDICINE

## 2021-08-09 PROCEDURE — 1160F RVW MEDS BY RX/DR IN RCRD: CPT | Performed by: INTERNAL MEDICINE

## 2021-08-09 PROCEDURE — 99497 ADVNCD CARE PLAN 30 MIN: CPT | Performed by: INTERNAL MEDICINE

## 2021-08-09 PROCEDURE — 1125F AMNT PAIN NOTED PAIN PRSNT: CPT | Performed by: INTERNAL MEDICINE

## 2021-08-09 RX ORDER — ATENOLOL 25 MG/1
12.5 TABLET ORAL DAILY
Qty: 45 TABLET | Refills: 3 | Status: SHIPPED | OUTPATIENT
Start: 2021-08-09 | End: 2022-08-07 | Stop reason: SDUPTHER

## 2021-08-09 NOTE — TELEPHONE ENCOUNTER
PT ASKED AT CHECK OUT IF HIS LABS COULD BE ORDER FOR NEXT YEAR, SO THAT HE MAY COME IN A WEEK EARLY TO HAVE THEM COMPLETED

## 2022-07-28 ENCOUNTER — TELEPHONE (OUTPATIENT)
Dept: INTERNAL MEDICINE | Facility: CLINIC | Age: 62
End: 2022-07-28

## 2022-07-28 DIAGNOSIS — Z12.5 SCREENING FOR PROSTATE CANCER: ICD-10-CM

## 2022-07-28 DIAGNOSIS — I10 ESSENTIAL HYPERTENSION: ICD-10-CM

## 2022-07-28 DIAGNOSIS — Z00.00 MEDICARE ANNUAL WELLNESS VISIT, SUBSEQUENT: Primary | ICD-10-CM

## 2022-07-28 DIAGNOSIS — E11.65 TYPE 2 DIABETES MELLITUS WITH HYPERGLYCEMIA, WITHOUT LONG-TERM CURRENT USE OF INSULIN: ICD-10-CM

## 2022-07-28 DIAGNOSIS — E78.2 MIXED HYPERLIPIDEMIA: ICD-10-CM

## 2022-08-04 ENCOUNTER — LAB (OUTPATIENT)
Dept: LAB | Facility: HOSPITAL | Age: 62
End: 2022-08-04

## 2022-08-04 DIAGNOSIS — Z12.5 SCREENING FOR PROSTATE CANCER: ICD-10-CM

## 2022-08-04 DIAGNOSIS — E78.2 MIXED HYPERLIPIDEMIA: ICD-10-CM

## 2022-08-04 DIAGNOSIS — Z00.00 MEDICARE ANNUAL WELLNESS VISIT, SUBSEQUENT: ICD-10-CM

## 2022-08-04 DIAGNOSIS — I10 ESSENTIAL HYPERTENSION: ICD-10-CM

## 2022-08-04 DIAGNOSIS — E11.65 TYPE 2 DIABETES MELLITUS WITH HYPERGLYCEMIA, WITHOUT LONG-TERM CURRENT USE OF INSULIN: ICD-10-CM

## 2022-08-04 LAB
ALBUMIN SERPL-MCNC: 4.4 G/DL (ref 3.5–5.2)
ALBUMIN UR-MCNC: 1.7 MG/DL
ALBUMIN/GLOB SERPL: 1.9 G/DL
ALP SERPL-CCNC: 94 U/L (ref 39–117)
ALT SERPL W P-5'-P-CCNC: 14 U/L (ref 1–41)
AMORPH URATE CRY URNS QL MICRO: NORMAL /HPF
ANION GAP SERPL CALCULATED.3IONS-SCNC: 12 MMOL/L (ref 5–15)
AST SERPL-CCNC: 25 U/L (ref 1–40)
BACTERIA UR QL AUTO: NORMAL /HPF
BASOPHILS # BLD AUTO: 0.05 10*3/MM3 (ref 0–0.2)
BASOPHILS NFR BLD AUTO: 0.8 % (ref 0–1.5)
BILIRUB SERPL-MCNC: 0.5 MG/DL (ref 0–1.2)
BILIRUB UR QL STRIP: NEGATIVE
BUN SERPL-MCNC: 8 MG/DL (ref 8–23)
BUN/CREAT SERPL: 8.9 (ref 7–25)
CALCIUM SPEC-SCNC: 8.8 MG/DL (ref 8.6–10.5)
CHLORIDE SERPL-SCNC: 103 MMOL/L (ref 98–107)
CHOLEST SERPL-MCNC: 152 MG/DL (ref 0–200)
CLARITY UR: ABNORMAL
CO2 SERPL-SCNC: 25 MMOL/L (ref 22–29)
COLOR UR: ABNORMAL
CREAT SERPL-MCNC: 0.9 MG/DL (ref 0.76–1.27)
CREAT UR-MCNC: 327.1 MG/DL
DEPRECATED RDW RBC AUTO: 42.7 FL (ref 37–54)
EGFRCR SERPLBLD CKD-EPI 2021: 96.6 ML/MIN/1.73
EOSINOPHIL # BLD AUTO: 0.1 10*3/MM3 (ref 0–0.4)
EOSINOPHIL NFR BLD AUTO: 1.6 % (ref 0.3–6.2)
ERYTHROCYTE [DISTWIDTH] IN BLOOD BY AUTOMATED COUNT: 14.2 % (ref 12.3–15.4)
GLOBULIN UR ELPH-MCNC: 2.3 GM/DL
GLUCOSE SERPL-MCNC: 96 MG/DL (ref 65–99)
GLUCOSE UR STRIP-MCNC: NEGATIVE MG/DL
HBA1C MFR BLD: 5.8 % (ref 4.8–5.6)
HCT VFR BLD AUTO: 46.3 % (ref 37.5–51)
HDLC SERPL-MCNC: 30 MG/DL (ref 40–60)
HGB BLD-MCNC: 15.5 G/DL (ref 13–17.7)
HGB UR QL STRIP.AUTO: NEGATIVE
HYALINE CASTS UR QL AUTO: NORMAL /LPF
IMM GRANULOCYTES # BLD AUTO: 0.01 10*3/MM3 (ref 0–0.05)
IMM GRANULOCYTES NFR BLD AUTO: 0.2 % (ref 0–0.5)
KETONES UR QL STRIP: ABNORMAL
LDLC SERPL CALC-MCNC: 86 MG/DL (ref 0–100)
LDLC/HDLC SERPL: 2.65 {RATIO}
LEUKOCYTE ESTERASE UR QL STRIP.AUTO: NEGATIVE
LYMPHOCYTES # BLD AUTO: 1.46 10*3/MM3 (ref 0.7–3.1)
LYMPHOCYTES NFR BLD AUTO: 23.9 % (ref 19.6–45.3)
MCH RBC QN AUTO: 27.9 PG (ref 26.6–33)
MCHC RBC AUTO-ENTMCNC: 33.5 G/DL (ref 31.5–35.7)
MCV RBC AUTO: 83.3 FL (ref 79–97)
MICROALBUMIN/CREAT UR: 5.2 MG/G
MONOCYTES # BLD AUTO: 0.46 10*3/MM3 (ref 0.1–0.9)
MONOCYTES NFR BLD AUTO: 7.5 % (ref 5–12)
NEUTROPHILS NFR BLD AUTO: 4.03 10*3/MM3 (ref 1.7–7)
NEUTROPHILS NFR BLD AUTO: 66 % (ref 42.7–76)
NITRITE UR QL STRIP: NEGATIVE
NRBC BLD AUTO-RTO: 0 /100 WBC (ref 0–0.2)
PH UR STRIP.AUTO: <=5 [PH] (ref 5–8)
PLATELET # BLD AUTO: 206 10*3/MM3 (ref 140–450)
PMV BLD AUTO: 11.1 FL (ref 6–12)
POTASSIUM SERPL-SCNC: 4.2 MMOL/L (ref 3.5–5.2)
PROT SERPL-MCNC: 6.7 G/DL (ref 6–8.5)
PROT UR QL STRIP: ABNORMAL
PSA SERPL-MCNC: 0.73 NG/ML (ref 0–4)
RBC # BLD AUTO: 5.56 10*6/MM3 (ref 4.14–5.8)
RBC # UR STRIP: NORMAL /HPF
REF LAB TEST METHOD: NORMAL
SODIUM SERPL-SCNC: 140 MMOL/L (ref 136–145)
SP GR UR STRIP: >=1.03 (ref 1–1.03)
SQUAMOUS #/AREA URNS HPF: NORMAL /HPF
TRIGL SERPL-MCNC: 213 MG/DL (ref 0–150)
TSH SERPL DL<=0.05 MIU/L-ACNC: 1.48 UIU/ML (ref 0.27–4.2)
UROBILINOGEN UR QL STRIP: ABNORMAL
VLDLC SERPL-MCNC: 36 MG/DL (ref 5–40)
WBC # UR STRIP: NORMAL /HPF
WBC NRBC COR # BLD: 6.11 10*3/MM3 (ref 3.4–10.8)

## 2022-08-04 PROCEDURE — G0103 PSA SCREENING: HCPCS

## 2022-08-04 PROCEDURE — 85025 COMPLETE CBC W/AUTO DIFF WBC: CPT

## 2022-08-04 PROCEDURE — 82043 UR ALBUMIN QUANTITATIVE: CPT

## 2022-08-04 PROCEDURE — 80053 COMPREHEN METABOLIC PANEL: CPT

## 2022-08-04 PROCEDURE — 82570 ASSAY OF URINE CREATININE: CPT

## 2022-08-04 PROCEDURE — 84443 ASSAY THYROID STIM HORMONE: CPT

## 2022-08-04 PROCEDURE — 81001 URINALYSIS AUTO W/SCOPE: CPT

## 2022-08-04 PROCEDURE — 83036 HEMOGLOBIN GLYCOSYLATED A1C: CPT

## 2022-08-04 PROCEDURE — 80061 LIPID PANEL: CPT

## 2022-08-07 NOTE — ASSESSMENT & PLAN NOTE
Health maintenance - counseling given and patient refuses all vaccines; counseling re: colon CA and prostate CA screening and rationale for screening for early detection/intervention - patient refuses all screening except for PSA; reports inability to afford eye and dental exams - reviewed medical reasons to update these exams and to check with health dept to [facility] affordable services; (+) seat belt use    Consultants:  Patient Care Team:  Vilma Brown MD as PCP - General (Internal Medicine)  Megan Barker MD as Consulting Physician (Physical Medicine and Rehabilitation)  Gasper Childress II, MD as Consulting Physician (Pain Medicine)

## 2022-08-07 NOTE — ASSESSMENT & PLAN NOTE
Chronically elevated TGs but current lipids stable with LDL 86, ; decrease saturated fats and cholesterol in the diet; repeat lipids in 12 mos

## 2022-08-07 NOTE — ASSESSMENT & PLAN NOTE
Chronically elevated TGs but current lipids stable for patient with ; decrease saturated fats and cholesterol in the diet; repeat lipids in 12 mos

## 2022-08-07 NOTE — PROGRESS NOTES
"  ANNUAL WELLNESS VISIT    DRUG AND ALCOHOL USE      no alcohol use, no tobacco use and no caffeine use    DIET AND PHYSICAL ACTIVITY     Diet: general    Exercise: frequently   Exercise Details: walking and stretching    MOOD DISORDER AND COGNITIVE SCREENING   Depression Screening Tool Used yes - see PHQ-9; components reviewed with patient; reports some days of feeling down, depressed, hopeless, and not having pleasure in doing things but states that he is stable, \"put things in God's hands\", and does not have depression.  Screening is negative for active depression.  He is on chronic alprazolam as needed for anxiety and reports this is stable as well.   Anxiety Screening Tool Used yes     AUDIT screening 0     Mini-Cog Performed   Yes    1. Tell Patient 3 Words car apple pen     2. Administer Clock Test normal    3. Recall 3 words  car apple pen    4. Number Correct Items 3    FUNCTIONAL ABILITY AND LEVEL OF SAFETY   Hearing mild hearing loss     Wears Hearing Aids No       Current Activities Independent      none  - see Funct/Cog Status Intake     Fall Risk Assessment       Has difficulty with walking or balance  No         Timed Up and Go (TUG) Test  8 sec.       If >12 sec, normal    ADVANCED DIRECTIVE  Advance Care Planning   ACP discussion was declined by the patient. Patient does not have an advance directive, information provided.  Advance Care Planning Discussion:  Patient does not have advanced directive and living will.  Reviewed desires for end of life care, which is to have comfort care.  Patient does not want extraordinary life-sustaining measures, including no prolonged artificial life support. Encouraged patient to ensure family is aware of desires/preferences. Did not want to discuss any further.    PAIN SCREENING Do you have pain right now? yes      If so, 1-10 scale: 8     Constant     Do you have pain every day? Yes      Probable chronic pain: Yes -on chronic oxycodone per pain clinic    Recent " Hospitalizations:  No recent hospitalization(s)..     MEDICATION REVIEW   - updated and reviewed (see Medication List).   - reviewed for potentially harmful drug-disease interactions in the elderly.   - reviewed for high risk medications in the elderly.   - aspirin use: No    BMI  Body mass index is 27.29 kg/m².  BMI is >= 25 and <30. (Overweight) The following options were offered after discussion;: exercise counseling/recommendations and nutrition counseling/recommendations       _________________________________________________________    Chief Complaint   Patient presents with   • Medicare Wellness-subsequent   • Hypertension   • Hyperlipidemia     History of Present Illness  62 y.o.  gentleman presents for updated annual wellness visit as well as f/u on BP and cholesterol.  States that he cannot eat the way he wants to eat due to cost.  Has not updated eye exam and dental exam also due to cost.  Has been very physically active this summer with profuse sweating and thinks this has helped some of his labs.    Reports previously having issues with the right big toenail growing under his toe.  The toenail is growing straight now but is thickened and yellow.  It is not causing any pain.  Thickened toenail discoloration on the left big toe has resolved with application of tea tree oil.    Reports left earache for the last week.  No hearing changes.  Does use Q-tips in his ear to remove wax.    Review of Systems  Denies headaches, visual changes, CP, palpitations, SOB, cough, abd pain, n/v/d, difficulty with urination, numbness/tingling, falls, mood changes, lightheadedness, hearing changes, rashes. Toenail changes as noted, only affecting right big toe currently. ROS (+) for chronic pain as noted, stable.     All other ROS reviewed and negative.    Current Outpatient Medications:   •  ALPRAZolam (XANAX) 1 MG BID  •  aspirin-acetaminophen-caffeine (EXCEDRIN MIGRAINE) 250-250-65 MG prn  •  atenolol (TENORMIN)  "25 MG 1/2 QD  •  fluticasone (FLONASE) 50 MCG/ACT nasal spray AD  •  loratadine (ALAVERT) 10 MG QD  •  oxyCODONE-acetaminophen (PERCOCET)  MG TID per pain clinic    FH: mom -  after 2 CVAs, also with dementia    VITALS:  /80   Pulse 61   Ht 165.1 cm (65\")   Wt 74.4 kg (164 lb)   SpO2 98%   BMI 27.29 kg/m²     Physical Exam  Vitals and nursing note reviewed.   Constitutional:       General: He is not in acute distress.     Appearance: Normal appearance. He is well-developed.   HENT:      Head: Normocephalic.      Right Ear: Tympanic membrane, ear canal and external ear normal.      Left Ear: Tympanic membrane, ear canal and external ear normal.      Nose: Nose normal.   Eyes:      Extraocular Movements: Extraocular movements intact.      Conjunctiva/sclera: Conjunctivae normal.      Pupils: Pupils are equal, round, and reactive to light.   Neck:      Vascular: No carotid bruit (bilaterally).   Cardiovascular:      Rate and Rhythm: Regular rhythm. Bradycardia present.      Heart sounds: Normal heart sounds.   Pulmonary:      Effort: Pulmonary effort is normal. No respiratory distress.      Breath sounds: Normal breath sounds. No wheezing or rales.   Abdominal:      General: Bowel sounds are normal. There is no distension.      Palpations: Abdomen is soft. There is no mass.      Tenderness: There is no abdominal tenderness.   Genitourinary:     Comments: Gu/NIR declines  Musculoskeletal:      Cervical back: Normal range of motion and neck supple.   Feet:      Right foot:      Skin integrity: Skin integrity normal.      Left foot:      Skin integrity: Skin integrity normal.      Comments: Diabetic Foot Exam Performed  Lymphadenopathy:      Cervical: No cervical adenopathy.   Skin:     General: Skin is warm and dry.      Findings: No rash.      Comments: Right big toenail diffusely thickened and yellow, no cracking, no brittle changes, no pain or bleeding; left big toenail appears normal "   Neurological:      Mental Status: He is alert and oriented to person, place, and time.      Cranial Nerves: No cranial nerve deficit.      Deep Tendon Reflexes: Reflexes normal.   Psychiatric:         Mood and Affect: Mood normal.         Behavior: Behavior normal.           LABS  Results for orders placed or performed in visit on 08/04/22   Comprehensive Metabolic Panel    Specimen: Blood   Result Value Ref Range    Glucose 96 65 - 99 mg/dL    BUN 8 8 - 23 mg/dL    Creatinine 0.90 0.76 - 1.27 mg/dL    Sodium 140 136 - 145 mmol/L    Potassium 4.2 3.5 - 5.2 mmol/L    Chloride 103 98 - 107 mmol/L    CO2 25.0 22.0 - 29.0 mmol/L    Calcium 8.8 8.6 - 10.5 mg/dL    Total Protein 6.7 6.0 - 8.5 g/dL    Albumin 4.40 3.50 - 5.20 g/dL    ALT (SGPT) 14 1 - 41 U/L    AST (SGOT) 25 1 - 40 U/L    Alkaline Phosphatase 94 39 - 117 U/L    Total Bilirubin 0.5 0.0 - 1.2 mg/dL    Globulin 2.3 gm/dL    A/G Ratio 1.9 g/dL    BUN/Creatinine Ratio 8.9 7.0 - 25.0    Anion Gap 12.0 5.0 - 15.0 mmol/L    eGFR 96.6 >60.0 mL/min/1.73   Lipid Panel    Specimen: Blood   Result Value Ref Range    Total Cholesterol 152 0 - 200 mg/dL    Triglycerides 213 (H) 0 - 150 mg/dL    HDL Cholesterol 30 (L) 40 - 60 mg/dL    LDL Cholesterol  86 0 - 100 mg/dL    VLDL Cholesterol 36 5 - 40 mg/dL    LDL/HDL Ratio 2.65    TSH    Specimen: Blood   Result Value Ref Range    TSH 1.480 0.270 - 4.200 uIU/mL   Microalbumin / Creatinine Urine Ratio - Urine, Clean Catch    Specimen: Urine, Clean Catch   Result Value Ref Range    Microalbumin/Creatinine Ratio 5.2 mg/g    Creatinine, Urine 327.1 mg/dL    Microalbumin, Urine 1.7 mg/dL   PSA Screen    Specimen: Blood   Result Value Ref Range    PSA 0.726 0.000 - 4.000 ng/mL   Hemoglobin A1c    Specimen: Blood   Result Value Ref Range    Hemoglobin A1C 5.80 (H) 4.80 - 5.60 %   CBC Auto Differential    Specimen: Blood   Result Value Ref Range    WBC 6.11 3.40 - 10.80 10*3/mm3    RBC 5.56 4.14 - 5.80 10*6/mm3    Hemoglobin 15.5  13.0 - 17.7 g/dL    Hematocrit 46.3 37.5 - 51.0 %    MCV 83.3 79.0 - 97.0 fL    MCH 27.9 26.6 - 33.0 pg    MCHC 33.5 31.5 - 35.7 g/dL    RDW 14.2 12.3 - 15.4 %    RDW-SD 42.7 37.0 - 54.0 fl    MPV 11.1 6.0 - 12.0 fL    Platelets 206 140 - 450 10*3/mm3    Neutrophil % 66.0 42.7 - 76.0 %    Lymphocyte % 23.9 19.6 - 45.3 %    Monocyte % 7.5 5.0 - 12.0 %    Eosinophil % 1.6 0.3 - 6.2 %    Basophil % 0.8 0.0 - 1.5 %    Immature Grans % 0.2 0.0 - 0.5 %    Neutrophils, Absolute 4.03 1.70 - 7.00 10*3/mm3    Lymphocytes, Absolute 1.46 0.70 - 3.10 10*3/mm3    Monocytes, Absolute 0.46 0.10 - 0.90 10*3/mm3    Eosinophils, Absolute 0.10 0.00 - 0.40 10*3/mm3    Basophils, Absolute 0.05 0.00 - 0.20 10*3/mm3    Immature Grans, Absolute 0.01 0.00 - 0.05 10*3/mm3    nRBC 0.0 0.0 - 0.2 /100 WBC   Urinalysis without microscopic (no culture) - Urine, Clean Catch    Specimen: Urine, Clean Catch   Result Value Ref Range    Color, UA Dark Yellow (A) Yellow, Straw    Appearance, UA Turbid (A) Clear    pH, UA <=5.0 5.0 - 8.0    Specific Gravity, UA >=1.030 1.005 - 1.030    Glucose, UA Negative Negative    Ketones, UA Trace (A) Negative    Bilirubin, UA Negative Negative    Blood, UA Negative Negative    Protein, UA Trace (A) Negative    Leuk Esterase, UA Negative Negative    Nitrite, UA Negative Negative    Urobilinogen, UA 0.2 E.U./dL 0.2 - 1.0 E.U./dL   Urinalysis, Microscopic Only - Urine, Clean Catch    Specimen: Urine, Clean Catch   Result Value Ref Range    RBC, UA None Seen None Seen, 0-2 /HPF    WBC, UA 0-2 None Seen, 0-2 /HPF    Bacteria, UA None Seen None Seen /HPF    Squamous Epithelial Cells, UA 0-2 None Seen, 0-2 /HPF    Hyaline Casts, UA None Seen None Seen /LPF    Amorphous Crystals, UA Large/3+ None Seen /HPF    Methodology Manual Light Microscopy      8/21 A1C 5.6    ECG 12 Lead    Date/Time: 8/11/2022 10:00 AM  Performed by: Vilma Brown MD  Authorized by: Vilma Brown MD   Comparison: compared with previous ECG from  8/9/2021  Similar to previous ECG  Rhythm: sinus rhythm and sinus bradycardia  Rate: normal  BPM: 57  Conduction: conduction normal  ST Segments: ST segments normal  T Waves: T waves normal  QRS axis: left  Clinical impression comment: stable EKG              ASSESSMENT/PLAN    Diagnoses and all orders for this visit:    1. Medicare annual wellness visit, subsequent (Primary)  Assessment & Plan:  Health maintenance - counseling given and patient refuses all vaccines; counseling re: colon CA and prostate CA screening and rationale for screening for early detection/intervention - patient refuses all screening except for PSA; reports inability to afford eye and dental exams - reviewed medical reasons to update these exams and to check with health dept to [facility] affordable services; (+) seat belt use    Consultants:  Patient Care Team:  Vilma Brown MD as PCP - General (Internal Medicine)  Megan Barker MD as Consulting Physician (Physical Medicine and Rehabilitation)  Gasper Childress II, MD as Consulting Physician (Pain Medicine)          2. Type 2 diabetes mellitus with hyperglycemia, without long-term current use of insulin (HCC)  Assessment & Plan:  BG control stable with A1C 5.8; encouraged reg phys activity to decr insulin resistance, moderation in unhealthy starches/sweets; f/u A1C in 6 mos        3. Hypertension  Assessment & Plan:  BP bord/stable; cont atenolol 25mg 1/2 QD #45, 3RF    Orders:  -     ECG 12 Lead  -     atenolol (TENORMIN) 25 MG tablet; Take 0.5 tablets by mouth Daily.  Dispense: 45 tablet; Refill: 3    4. Hyperlipidemia  Assessment & Plan:  Chronically elevated TGs but current lipids stable with LDL 86, ; decrease saturated fats and cholesterol in the diet; repeat lipids in 12 mos        5. Familial hypertriglyceridemia  Assessment & Plan:  Chronically elevated TGs but current lipids stable for patient with ; decrease saturated fats and cholesterol in the diet; repeat lipids  in 12 mos        6. Seasonal allergic rhinitis due to pollen  Assessment & Plan:  On loratadine 5mg QD with occ flonase and occ incr to 10mg QD; advise against 20ng loratadine      7. Earache on left  Comments:  Attributed to eustachian tube dysfunction continue loratadine and agree with addition of Flonase      FOLLOW-UP  RTC 1 yr for annual wellness; fasting labs the week prior to appt (CBC, CMP, TSH, lipids, UA/micro, PSA, A1C, microalb)        Electronically signed by:    Vilma Brown MD, FACP  08/11/2022

## 2022-08-11 ENCOUNTER — OFFICE VISIT (OUTPATIENT)
Dept: INTERNAL MEDICINE | Facility: CLINIC | Age: 62
End: 2022-08-11

## 2022-08-11 VITALS
OXYGEN SATURATION: 98 % | DIASTOLIC BLOOD PRESSURE: 80 MMHG | HEIGHT: 65 IN | WEIGHT: 164 LBS | SYSTOLIC BLOOD PRESSURE: 122 MMHG | BODY MASS INDEX: 27.32 KG/M2 | HEART RATE: 61 BPM

## 2022-08-11 DIAGNOSIS — H92.02 EARACHE ON LEFT: ICD-10-CM

## 2022-08-11 DIAGNOSIS — J30.1 SEASONAL ALLERGIC RHINITIS DUE TO POLLEN: ICD-10-CM

## 2022-08-11 DIAGNOSIS — E11.65 TYPE 2 DIABETES MELLITUS WITH HYPERGLYCEMIA, WITHOUT LONG-TERM CURRENT USE OF INSULIN: Chronic | ICD-10-CM

## 2022-08-11 DIAGNOSIS — I10 ESSENTIAL HYPERTENSION: Chronic | ICD-10-CM

## 2022-08-11 DIAGNOSIS — Z00.00 MEDICARE ANNUAL WELLNESS VISIT, SUBSEQUENT: Primary | Chronic | ICD-10-CM

## 2022-08-11 DIAGNOSIS — E78.1 FAMILIAL HYPERTRIGLYCERIDEMIA: Chronic | ICD-10-CM

## 2022-08-11 DIAGNOSIS — E78.2 MIXED HYPERLIPIDEMIA: Chronic | ICD-10-CM

## 2022-08-11 PROCEDURE — 1159F MED LIST DOCD IN RCRD: CPT | Performed by: INTERNAL MEDICINE

## 2022-08-11 PROCEDURE — 1170F FXNL STATUS ASSESSED: CPT | Performed by: INTERNAL MEDICINE

## 2022-08-11 PROCEDURE — 1125F AMNT PAIN NOTED PAIN PRSNT: CPT | Performed by: INTERNAL MEDICINE

## 2022-08-11 PROCEDURE — 93000 ELECTROCARDIOGRAM COMPLETE: CPT | Performed by: INTERNAL MEDICINE

## 2022-08-11 PROCEDURE — G0439 PPPS, SUBSEQ VISIT: HCPCS | Performed by: INTERNAL MEDICINE

## 2022-08-11 PROCEDURE — 99213 OFFICE O/P EST LOW 20 MIN: CPT | Performed by: INTERNAL MEDICINE

## 2022-08-11 RX ORDER — ATENOLOL 25 MG/1
12.5 TABLET ORAL DAILY
Qty: 45 TABLET | Refills: 3 | Status: SHIPPED | OUTPATIENT
Start: 2022-08-11

## 2022-09-20 DIAGNOSIS — I10 ESSENTIAL HYPERTENSION: Chronic | ICD-10-CM

## 2022-09-20 RX ORDER — ATENOLOL 25 MG/1
TABLET ORAL
Qty: 45 TABLET | Refills: 3 | OUTPATIENT
Start: 2022-09-20

## 2023-01-09 ENCOUNTER — OFFICE VISIT (OUTPATIENT)
Dept: INTERNAL MEDICINE | Facility: CLINIC | Age: 63
End: 2023-01-09
Payer: MEDICARE

## 2023-01-09 ENCOUNTER — PATIENT OUTREACH (OUTPATIENT)
Dept: CASE MANAGEMENT | Facility: OTHER | Age: 63
End: 2023-01-09
Payer: MEDICARE

## 2023-01-09 VITALS
OXYGEN SATURATION: 98 % | WEIGHT: 161 LBS | BODY MASS INDEX: 26.82 KG/M2 | DIASTOLIC BLOOD PRESSURE: 84 MMHG | HEART RATE: 93 BPM | HEIGHT: 65 IN | SYSTOLIC BLOOD PRESSURE: 130 MMHG

## 2023-01-09 DIAGNOSIS — I10 ESSENTIAL HYPERTENSION: Chronic | ICD-10-CM

## 2023-01-09 DIAGNOSIS — M25.531 PAIN AND SWELLING OF RIGHT WRIST: ICD-10-CM
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
## 2023-01-09 DIAGNOSIS — M25.431 PAIN AND SWELLING OF RIGHT WRIST: ICD-10-CM

## 2023-01-09 DIAGNOSIS — S50.02XD CONTUSION OF LEFT ELBOW, SUBSEQUENT ENCOUNTER: ICD-10-CM

## 2023-01-09 DIAGNOSIS — S70.01XD CONTUSION OF RIGHT HIP, SUBSEQUENT ENCOUNTER: ICD-10-CM

## 2023-01-09 PROCEDURE — 99214 OFFICE O/P EST MOD 30 MIN: CPT | Performed by: INTERNAL MEDICINE

## 2023-01-09 NOTE — PROGRESS NOTES
"Chief Complaint   Patient presents with   • Transitional Care Management   • Fall       History of Present Illness  62 y.o.  gentleman presents for ER follow-up after fall. Seen 1/1/23 after slipping on tile at the entrance after walking into [employer] in [place] 12/31/22. Fell backwards, hitting back of head, left elbow, back, as well as right wrist and right hip. Also with neck pain. Reports right wrist has been swollen with decreased  strength and ROM. Continues to have pain at right hip, back and left elbow. No fxs noted on Xray. Is concerned whether [facility] will get charged for these bills; has incident form from [employer]. His  has advised he needs to get help from  who works in [place].     ER eval included neg left elbow and neg right wrist xrays. BP was elevated 168/95. Discharged with prn NSAIDs.    He does not have fridge/freezer where he lives.    Review of Systems  ROS (+) for fall with secondary neck pain, right hip pain and back pain, right wrist and left elbow pain.  All other ROS reviewed and negative.      Current Outpatient Medications:   •  ALPRAZolam (XANAX) 1 MG BID prn  •  aspirin-acetaminophen-caffeine (EXCEDRIN MIGRAINE) 250-250-65 MG prn  •  atenolol (TENORMIN) 25 MG 1/2 QD  •  fluticasone (FLONASE) 50 MCG/ACT nasal spray AD  •  loratadine (CLARITIN) 10 MG 1/2 QD  •  oxyCODONE-acetaminophen (PERCOCET)  MG TID      VITALS:  /84   Pulse 93   Ht 165.1 cm (65\")   Wt 73 kg (161 lb)   SpO2 98%   BMI 26.79 kg/m²     Physical Exam  Vitals and nursing note reviewed.   Constitutional:       General: He is not in acute distress.     Appearance: Normal appearance. He is not ill-appearing.   Eyes:      Extraocular Movements: Extraocular movements intact.      Conjunctiva/sclera: Conjunctivae normal.   Pulmonary:      Effort: Pulmonary effort is normal. No respiratory distress.   Musculoskeletal:         General: Swelling (right wrist mildly swollen without erythema or " increased warmth but mild decreased flexion compared to left, extension intake; fingers FROM) present.      Cervical back: Normal range of motion. No rigidity.      Comments: bilat elbows, hips, knees FROM; normal gait and posture   Neurological:      Mental Status: He is alert and oriented to person, place, and time. Mental status is at baseline.   Psychiatric:         Mood and Affect: Mood normal.         Behavior: Behavior normal.         LABS  Results for orders placed or performed in visit on 08/04/22   Comprehensive Metabolic Panel    Specimen: Blood   Result Value Ref Range    Glucose 96 65 - 99 mg/dL    BUN 8 8 - 23 mg/dL    Creatinine 0.90 0.76 - 1.27 mg/dL    Sodium 140 136 - 145 mmol/L    Potassium 4.2 3.5 - 5.2 mmol/L    Chloride 103 98 - 107 mmol/L    CO2 25.0 22.0 - 29.0 mmol/L    Calcium 8.8 8.6 - 10.5 mg/dL    Total Protein 6.7 6.0 - 8.5 g/dL    Albumin 4.40 3.50 - 5.20 g/dL    ALT (SGPT) 14 1 - 41 U/L    AST (SGOT) 25 1 - 40 U/L    Alkaline Phosphatase 94 39 - 117 U/L    Total Bilirubin 0.5 0.0 - 1.2 mg/dL    Globulin 2.3 gm/dL    A/G Ratio 1.9 g/dL    BUN/Creatinine Ratio 8.9 7.0 - 25.0    Anion Gap 12.0 5.0 - 15.0 mmol/L    eGFR 96.6 >60.0 mL/min/1.73   Lipid Panel    Specimen: Blood   Result Value Ref Range    Total Cholesterol 152 0 - 200 mg/dL    Triglycerides 213 (H) 0 - 150 mg/dL    HDL Cholesterol 30 (L) 40 - 60 mg/dL    LDL Cholesterol  86 0 - 100 mg/dL    VLDL Cholesterol 36 5 - 40 mg/dL    LDL/HDL Ratio 2.65    TSH    Specimen: Blood   Result Value Ref Range    TSH 1.480 0.270 - 4.200 uIU/mL   Microalbumin / Creatinine Urine Ratio - Urine, Clean Catch    Specimen: Urine, Clean Catch   Result Value Ref Range    Microalbumin/Creatinine Ratio 5.2 mg/g    Creatinine, Urine 327.1 mg/dL    Microalbumin, Urine 1.7 mg/dL   PSA Screen    Specimen: Blood   Result Value Ref Range    PSA 0.726 0.000 - 4.000 ng/mL   Hemoglobin A1c    Specimen: Blood   Result Value Ref Range    Hemoglobin A1C 5.80 (H)  4.80 - 5.60 %   CBC Auto Differential    Specimen: Blood   Result Value Ref Range    WBC 6.11 3.40 - 10.80 10*3/mm3    RBC 5.56 4.14 - 5.80 10*6/mm3    Hemoglobin 15.5 13.0 - 17.7 g/dL    Hematocrit 46.3 37.5 - 51.0 %    MCV 83.3 79.0 - 97.0 fL    MCH 27.9 26.6 - 33.0 pg    MCHC 33.5 31.5 - 35.7 g/dL    RDW 14.2 12.3 - 15.4 %    RDW-SD 42.7 37.0 - 54.0 fl    MPV 11.1 6.0 - 12.0 fL    Platelets 206 140 - 450 10*3/mm3    Neutrophil % 66.0 42.7 - 76.0 %    Lymphocyte % 23.9 19.6 - 45.3 %    Monocyte % 7.5 5.0 - 12.0 %    Eosinophil % 1.6 0.3 - 6.2 %    Basophil % 0.8 0.0 - 1.5 %    Immature Grans % 0.2 0.0 - 0.5 %    Neutrophils, Absolute 4.03 1.70 - 7.00 10*3/mm3    Lymphocytes, Absolute 1.46 0.70 - 3.10 10*3/mm3    Monocytes, Absolute 0.46 0.10 - 0.90 10*3/mm3    Eosinophils, Absolute 0.10 0.00 - 0.40 10*3/mm3    Basophils, Absolute 0.05 0.00 - 0.20 10*3/mm3    Immature Grans, Absolute 0.01 0.00 - 0.05 10*3/mm3    nRBC 0.0 0.0 - 0.2 /100 WBC   Urinalysis without microscopic (no culture) - Urine, Clean Catch    Specimen: Urine, Clean Catch   Result Value Ref Range    Color, UA Dark Yellow (A) Yellow, Straw    Appearance, UA Turbid (A) Clear    pH, UA <=5.0 5.0 - 8.0    Specific Gravity, UA >=1.030 1.005 - 1.030    Glucose, UA Negative Negative    Ketones, UA Trace (A) Negative    Bilirubin, UA Negative Negative    Blood, UA Negative Negative    Protein, UA Trace (A) Negative    Leuk Esterase, UA Negative Negative    Nitrite, UA Negative Negative    Urobilinogen, UA 0.2 E.U./dL 0.2 - 1.0 E.U./dL   Urinalysis, Microscopic Only - Urine, Clean Catch    Specimen: Urine, Clean Catch   Result Value Ref Range    RBC, UA None Seen None Seen, 0-2 /HPF    WBC, UA 0-2 None Seen, 0-2 /HPF    Bacteria, UA None Seen None Seen /HPF    Squamous Epithelial Cells, UA 0-2 None Seen, 0-2 /HPF    Hyaline Casts, UA None Seen None Seen /LPF    Amorphous Crystals, UA Large/3+ None Seen /HPF    Methodology Manual Light Microscopy      1/1/23  L. Elbow Xray - no fx or dislocation, sm olecranon spur    1/1/23 R. Wrist Xray - no fx or dislocation, mild narrowing of 1st CMC    ASSESSMENT/PLAN    Diagnoses and all orders for this visit:    1. Fall, subsequent encounter (Primary)  Comments:  ER records records reviewed; sequelae as noted at right wrist, right hip, back, and left elbow; fall precautions; claim is against [employer]    2. Pain and swelling of right wrist  Comments:  rec ice (no acccess) and elevation; ok for prn NSAIDs; rec PT (none available where he lives)    3. Contusion of left elbow, subsequent encounter  Comments:  secondary to fall, no fx and elbow FROM, resolving    4. Contusion of right hip, subsequent encounter  Comments:  secondary to fall, no fx and normal gait, resolving    5. Hypertension  Assessment & Plan:  Exacerbated by pain due to his fall; BP back to his baseline 130/84; cont atenolol 25mg 1/2 QD      Time Documentation    Counseled patient  I spent 22 minutes face to face and 21 minutes non-face to face on today's office visit. Time was spent reviewing patient's previous notes, lab results, test results, vitals, and/or other records as well as examining the patient, providing counseling, coordinating care, answering questions, discussing evaluation and treatment plans, and writing this note.      Total time: 43 minutes    FOLLOW-UP  RTC next wellness 8/14/23; fasting labs prior to appt (CBC, CMP, TSH, lipids, UA/micr, PSA, A1C, microalb)    Electronically signed by:    Vilma Brown MD, FACP  01/09/2023

## 2023-01-09 NOTE — OUTREACH NOTE
AMBULATORY CASE MANAGEMENT NOTE    Name and Relationship of Patient/Support Person: Francisco Waggoner R - Self    Patient Outreach    Spoke with patient during today's PCP visit. He requested help following up on getting an EMS run sheet from [place]. This was obtained and a copy provided to patient. Provided patient with my contact information. He denied questions/needs at this time and will contact me if needed. Patient exhibits strong sense of health self-management and adequate support system. [facility] closed.     FREDY LAURENT  Ambulatory Case Management    1/9/2023, 14:51 EST

## 2023-08-01 DIAGNOSIS — E11.65 TYPE 2 DIABETES MELLITUS WITH HYPERGLYCEMIA, WITHOUT LONG-TERM CURRENT USE OF INSULIN: Chronic | ICD-10-CM

## 2023-08-01 DIAGNOSIS — E78.2 MIXED HYPERLIPIDEMIA: Chronic | ICD-10-CM

## 2023-08-01 DIAGNOSIS — Z00.00 MEDICARE ANNUAL WELLNESS VISIT, SUBSEQUENT: Primary | Chronic | ICD-10-CM

## 2023-08-01 DIAGNOSIS — Z12.5 SCREENING FOR PROSTATE CANCER: ICD-10-CM

## 2023-08-07 ENCOUNTER — LAB (OUTPATIENT)
Dept: LAB | Facility: HOSPITAL | Age: 63
End: 2023-08-07
Payer: MEDICARE

## 2023-08-07 DIAGNOSIS — E78.2 MIXED HYPERLIPIDEMIA: Chronic | ICD-10-CM

## 2023-08-07 DIAGNOSIS — Z12.5 SCREENING FOR PROSTATE CANCER: ICD-10-CM

## 2023-08-07 DIAGNOSIS — E11.65 TYPE 2 DIABETES MELLITUS WITH HYPERGLYCEMIA, WITHOUT LONG-TERM CURRENT USE OF INSULIN: Chronic | ICD-10-CM

## 2023-08-07 DIAGNOSIS — Z00.00 MEDICARE ANNUAL WELLNESS VISIT, SUBSEQUENT: ICD-10-CM

## 2023-08-07 LAB
ALBUMIN UR-MCNC: <1.2 MG/DL
BACTERIA UR QL AUTO: NORMAL /HPF
BASOPHILS # BLD AUTO: 0.04 10*3/MM3 (ref 0–0.2)
BASOPHILS NFR BLD AUTO: 0.4 % (ref 0–1.5)
BILIRUB UR QL STRIP: NEGATIVE
CLARITY UR: CLEAR
COLOR UR: YELLOW
CREAT UR-MCNC: 224.5 MG/DL
DEPRECATED RDW RBC AUTO: 41.4 FL (ref 37–54)
EOSINOPHIL # BLD AUTO: 0.04 10*3/MM3 (ref 0–0.4)
EOSINOPHIL NFR BLD AUTO: 0.4 % (ref 0.3–6.2)
ERYTHROCYTE [DISTWIDTH] IN BLOOD BY AUTOMATED COUNT: 13.4 % (ref 12.3–15.4)
GLUCOSE UR STRIP-MCNC: NEGATIVE MG/DL
HBA1C MFR BLD: 5.7 % (ref 4.8–5.6)
HCT VFR BLD AUTO: 46.3 % (ref 37.5–51)
HGB BLD-MCNC: 15.6 G/DL (ref 13–17.7)
HGB UR QL STRIP.AUTO: NEGATIVE
HYALINE CASTS UR QL AUTO: NORMAL /LPF
IMM GRANULOCYTES # BLD AUTO: 0.03 10*3/MM3 (ref 0–0.05)
IMM GRANULOCYTES NFR BLD AUTO: 0.3 % (ref 0–0.5)
KETONES UR QL STRIP: NEGATIVE
LEUKOCYTE ESTERASE UR QL STRIP.AUTO: NEGATIVE
LYMPHOCYTES # BLD AUTO: 1.85 10*3/MM3 (ref 0.7–3.1)
LYMPHOCYTES NFR BLD AUTO: 20.2 % (ref 19.6–45.3)
MCH RBC QN AUTO: 28.4 PG (ref 26.6–33)
MCHC RBC AUTO-ENTMCNC: 33.7 G/DL (ref 31.5–35.7)
MCV RBC AUTO: 84.3 FL (ref 79–97)
MICROALBUMIN/CREAT UR: NORMAL MG/G{CREAT}
MONOCYTES # BLD AUTO: 0.51 10*3/MM3 (ref 0.1–0.9)
MONOCYTES NFR BLD AUTO: 5.6 % (ref 5–12)
NEUTROPHILS NFR BLD AUTO: 6.7 10*3/MM3 (ref 1.7–7)
NEUTROPHILS NFR BLD AUTO: 73.1 % (ref 42.7–76)
NITRITE UR QL STRIP: NEGATIVE
NRBC BLD AUTO-RTO: 0 /100 WBC (ref 0–0.2)
PH UR STRIP.AUTO: 5.5 [PH] (ref 5–8)
PLATELET # BLD AUTO: 239 10*3/MM3 (ref 140–450)
PMV BLD AUTO: 12 FL (ref 6–12)
PROT UR QL STRIP: NEGATIVE
RBC # BLD AUTO: 5.49 10*6/MM3 (ref 4.14–5.8)
RBC # UR STRIP: NORMAL /HPF
REF LAB TEST METHOD: NORMAL
SP GR UR STRIP: 1.03 (ref 1–1.03)
SQUAMOUS #/AREA URNS HPF: NORMAL /HPF
UROBILINOGEN UR QL STRIP: NORMAL
WBC # UR STRIP: NORMAL /HPF
WBC NRBC COR # BLD: 9.17 10*3/MM3 (ref 3.4–10.8)

## 2023-08-07 PROCEDURE — 80061 LIPID PANEL: CPT

## 2023-08-07 PROCEDURE — 84443 ASSAY THYROID STIM HORMONE: CPT

## 2023-08-07 PROCEDURE — 81001 URINALYSIS AUTO W/SCOPE: CPT

## 2023-08-07 PROCEDURE — 82043 UR ALBUMIN QUANTITATIVE: CPT

## 2023-08-07 PROCEDURE — 82570 ASSAY OF URINE CREATININE: CPT

## 2023-08-07 PROCEDURE — 83036 HEMOGLOBIN GLYCOSYLATED A1C: CPT

## 2023-08-07 PROCEDURE — 80053 COMPREHEN METABOLIC PANEL: CPT

## 2023-08-07 PROCEDURE — G0103 PSA SCREENING: HCPCS

## 2023-08-07 PROCEDURE — 85025 COMPLETE CBC W/AUTO DIFF WBC: CPT

## 2023-08-08 LAB
ALBUMIN SERPL-MCNC: 4.9 G/DL (ref 3.5–5.2)
ALBUMIN/GLOB SERPL: 2.3 G/DL
ALP SERPL-CCNC: 84 U/L (ref 39–117)
ALT SERPL W P-5'-P-CCNC: 14 U/L (ref 1–41)
ANION GAP SERPL CALCULATED.3IONS-SCNC: 11 MMOL/L (ref 5–15)
AST SERPL-CCNC: 20 U/L (ref 1–40)
BILIRUB SERPL-MCNC: 0.3 MG/DL (ref 0–1.2)
BUN SERPL-MCNC: 15 MG/DL (ref 8–23)
BUN/CREAT SERPL: 16.9 (ref 7–25)
CALCIUM SPEC-SCNC: 9.3 MG/DL (ref 8.6–10.5)
CHLORIDE SERPL-SCNC: 107 MMOL/L (ref 98–107)
CHOLEST SERPL-MCNC: 174 MG/DL (ref 0–200)
CO2 SERPL-SCNC: 24 MMOL/L (ref 22–29)
CREAT SERPL-MCNC: 0.89 MG/DL (ref 0.76–1.27)
EGFRCR SERPLBLD CKD-EPI 2021: 96.3 ML/MIN/1.73
GLOBULIN UR ELPH-MCNC: 2.1 GM/DL
GLUCOSE SERPL-MCNC: 104 MG/DL (ref 65–99)
HDLC SERPL-MCNC: 38 MG/DL (ref 40–60)
LDLC SERPL CALC-MCNC: 117 MG/DL (ref 0–100)
LDLC/HDLC SERPL: 3.02 {RATIO}
POTASSIUM SERPL-SCNC: 4.1 MMOL/L (ref 3.5–5.2)
PROT SERPL-MCNC: 7 G/DL (ref 6–8.5)
PSA SERPL-MCNC: 0.61 NG/ML (ref 0–4)
SODIUM SERPL-SCNC: 142 MMOL/L (ref 136–145)
TRIGL SERPL-MCNC: 106 MG/DL (ref 0–150)
TSH SERPL DL<=0.05 MIU/L-ACNC: 1.09 UIU/ML (ref 0.27–4.2)
VLDLC SERPL-MCNC: 19 MG/DL (ref 5–40)

## 2023-08-13 NOTE — ASSESSMENT & PLAN NOTE
Health maintenance - counseling given and patient refuses all vaccines; counseling re: colon CA and prostate CA screening and pt declines all screening; strongly encouraged updated eye exam and dental exam but pt notes cost barrier; (+) seat belt use; patient also declines social work consult to help with food needs    Consultants:  Patient Care Team:  Vilma Brown MD as PCP - General (Internal Medicine)  Megan Barker MD as Consulting Physician (Physical Medicine and Rehabilitation)  Gasper Childress II, MD as Consulting Physician (Pain Medicine)

## 2023-08-13 NOTE — ASSESSMENT & PLAN NOTE
BG control stable with A1C 5.7; encouraged reg phys activity to decr insulin resistance, moderation in unhealthy starches/sweets; pt opts for annual monitoring

## 2023-08-13 NOTE — PROGRESS NOTES
ANNUAL WELLNESS VISIT    DRUG AND ALCOHOL USE      no alcohol use, no tobacco use, and no caffeine use    DIET AND PHYSICAL ACTIVITY     Diet: general    Exercise: frequently   Exercise Details: walking    MOOD DISORDER AND COGNITIVE SCREENING     PHQ-2 Depression Screening - components reviewed with patient; screening is positive for active depression.    Little interest or pleasure in doing things? 1-->several days   Feeling down, depressed, or hopeless? 1-->several days   PHQ-2 Total Score 2      Anxiety Screening Tool Used YES     AUDIT screening  0     Mini-Cog Performed   Yes    1. Tell Patient 3 Words Car table house    2. Administer Clock Test normal    3. Recall 3 words  Car table house    4. Number Correct Items 3    FUNCTIONAL ABILITY AND LEVEL OF SAFETY   Hearing no hearing loss     Wears Hearing Aids No       Current Activities Independent      none  - see Funct/Cog Status Intake     Fall Risk Assessment       Has difficulty with walking or balance  No         Timed Up and Go (TUG) Test  7 sec.       If >12 sec, normal    ADVANCED DIRECTIVE  Advance Care Planning   ACP discussion was declined by the patient. Patient does not have an advance directive, declines further assistance.      PAIN SCREENING Do you have pain right now? yes      If so, 1-10 scale: 4     Constant     Do you have pain every day? Yes      Probable chronic pain: Yes     Recent Hospitalizations:  No recent hospitalization(s)..     MEDICATION REVIEW   - updated and reviewed (see Medication List).   - reviewed for potentially harmful drug-disease interactions in the elderly.   - reviewed for high risk medications in the elderly.   - aspirin use: No    BMI  Body mass index is 27.29 kg/mý.  BMI is >= 25 and <30. (Overweight) The following options were offered after discussion;: exercise counseling/recommendations and nutrition counseling/recommendations       _________________________________________________________    Chief Complaint  "  Patient presents with    Medicare Wellness-subsequent    Hypertension    Hyperlipidemia       History of Present Illness  63 y.o.  gentleman presents for updated wellness visit and f/u on DM and cholesterol.    Reports finances make it difficult to eat well; has been drinking protein shakes to help. Not interested in social work help to see if there is help for food health needs.    Review of Systems  Denies headaches, visual changes, CP, palpitations, SOB, cough, abd pain, n/v/d, difficulty with urination, numbness/tingling, falls, mood changes, lightheadedness, hearing changes, rashes.   All other ROS reviewed and negative.      Current Outpatient Medications:     ALPRAZolam (XANAX) 1 MG BID    aspirin-acetaminophen-caffeine (EXCEDRIN MIGRAINE) 250-250-65 MG prn    atenolol (TENORMIN) 25 MG 1/2 QD    fluticasone (FLONASE) 50 MCG/ACT nasal spray AD    loratadine (CLARITIN) 10 MG QD    oxyCODONE-acetaminophen (PERCOCET)  MG TID      VITALS:  /74   Pulse 94   Ht 165.1 cm (65\")   Wt 74.4 kg (164 lb)   SpO2 97%   BMI 27.29 kg/mý     Physical Exam  Vitals and nursing note reviewed.   Constitutional:       General: He is not in acute distress.     Appearance: Normal appearance. He is well-developed.   HENT:      Head: Normocephalic.      Right Ear: Tympanic membrane, ear canal and external ear normal.      Left Ear: Tympanic membrane, ear canal and external ear normal.      Nose: Nose normal.   Eyes:      Extraocular Movements: Extraocular movements intact.      Conjunctiva/sclera: Conjunctivae normal.      Pupils: Pupils are equal, round, and reactive to light.   Neck:      Vascular: No carotid bruit (bilaterally).   Cardiovascular:      Rate and Rhythm: Normal rate and regular rhythm.      Heart sounds: Normal heart sounds.   Pulmonary:      Effort: Pulmonary effort is normal. No respiratory distress.      Breath sounds: Normal breath sounds. No wheezing or rales.   Abdominal:      General: " Bowel sounds are normal. There is no distension.      Palpations: Abdomen is soft. There is no mass.      Tenderness: There is no abdominal tenderness.   Musculoskeletal:      Cervical back: Normal range of motion and neck supple.      Right lower leg: No edema.      Left lower leg: No edema.   Lymphadenopathy:      Cervical: No cervical adenopathy.   Skin:     General: Skin is warm and dry.      Findings: No rash.   Neurological:      Mental Status: He is alert.      Cranial Nerves: No cranial nerve deficit.      Deep Tendon Reflexes: Reflexes normal.   Psychiatric:         Mood and Affect: Mood normal.         Behavior: Behavior normal.       LABS  Results for orders placed or performed in visit on 08/07/23   Comprehensive Metabolic Panel    Specimen: Blood   Result Value Ref Range    Glucose 104 (H) 65 - 99 mg/dL    BUN 15 8 - 23 mg/dL    Creatinine 0.89 0.76 - 1.27 mg/dL    Sodium 142 136 - 145 mmol/L    Potassium 4.1 3.5 - 5.2 mmol/L    Chloride 107 98 - 107 mmol/L    CO2 24.0 22.0 - 29.0 mmol/L    Calcium 9.3 8.6 - 10.5 mg/dL    Total Protein 7.0 6.0 - 8.5 g/dL    Albumin 4.9 3.5 - 5.2 g/dL    ALT (SGPT) 14 1 - 41 U/L    AST (SGOT) 20 1 - 40 U/L    Alkaline Phosphatase 84 39 - 117 U/L    Total Bilirubin 0.3 0.0 - 1.2 mg/dL    Globulin 2.1 gm/dL    A/G Ratio 2.3 g/dL    BUN/Creatinine Ratio 16.9 7.0 - 25.0    Anion Gap 11.0 5.0 - 15.0 mmol/L    eGFR 96.3 >60.0 mL/min/1.73   Lipid Panel    Specimen: Blood   Result Value Ref Range    Total Cholesterol 174 0 - 200 mg/dL    Triglycerides 106 0 - 150 mg/dL    HDL Cholesterol 38 (L) 40 - 60 mg/dL    LDL Cholesterol  117 (H) 0 - 100 mg/dL    VLDL Cholesterol 19 5 - 40 mg/dL    LDL/HDL Ratio 3.02    TSH    Specimen: Blood   Result Value Ref Range    TSH 1.090 0.270 - 4.200 uIU/mL   Microalbumin / Creatinine Urine Ratio - Urine, Clean Catch    Specimen: Urine, Clean Catch   Result Value Ref Range    Microalbumin/Creatinine Ratio      Creatinine, Urine 224.5 mg/dL     Microalbumin, Urine <1.2 mg/dL   Hemoglobin A1c    Specimen: Blood   Result Value Ref Range    Hemoglobin A1C 5.70 (H) 4.80 - 5.60 %   PSA Screen    Specimen: Blood   Result Value Ref Range    PSA 0.606 0.000 - 4.000 ng/mL   CBC Auto Differential    Specimen: Blood   Result Value Ref Range    WBC 9.17 3.40 - 10.80 10*3/mm3    RBC 5.49 4.14 - 5.80 10*6/mm3    Hemoglobin 15.6 13.0 - 17.7 g/dL    Hematocrit 46.3 37.5 - 51.0 %    MCV 84.3 79.0 - 97.0 fL    MCH 28.4 26.6 - 33.0 pg    MCHC 33.7 31.5 - 35.7 g/dL    RDW 13.4 12.3 - 15.4 %    RDW-SD 41.4 37.0 - 54.0 fl    MPV 12.0 6.0 - 12.0 fL    Platelets 239 140 - 450 10*3/mm3    Neutrophil % 73.1 42.7 - 76.0 %    Lymphocyte % 20.2 19.6 - 45.3 %    Monocyte % 5.6 5.0 - 12.0 %    Eosinophil % 0.4 0.3 - 6.2 %    Basophil % 0.4 0.0 - 1.5 %    Immature Grans % 0.3 0.0 - 0.5 %    Neutrophils, Absolute 6.70 1.70 - 7.00 10*3/mm3    Lymphocytes, Absolute 1.85 0.70 - 3.10 10*3/mm3    Monocytes, Absolute 0.51 0.10 - 0.90 10*3/mm3    Eosinophils, Absolute 0.04 0.00 - 0.40 10*3/mm3    Basophils, Absolute 0.04 0.00 - 0.20 10*3/mm3    Immature Grans, Absolute 0.03 0.00 - 0.05 10*3/mm3    nRBC 0.0 0.0 - 0.2 /100 WBC   Urinalysis without microscopic (no culture) - Urine, Clean Catch    Specimen: Urine, Clean Catch   Result Value Ref Range    Color, UA Yellow Yellow, Straw    Appearance, UA Clear Clear    pH, UA 5.5 5.0 - 8.0    Specific Gravity, UA 1.029 1.005 - 1.030    Glucose, UA Negative Negative    Ketones, UA Negative Negative    Bilirubin, UA Negative Negative    Blood, UA Negative Negative    Protein, UA Negative Negative    Leuk Esterase, UA Negative Negative    Nitrite, UA Negative Negative    Urobilinogen, UA 0.2 E.U./dL 0.2 - 1.0 E.U./dL   Urinalysis, Microscopic Only - Urine, Clean Catch    Specimen: Urine, Clean Catch   Result Value Ref Range    RBC, UA 0-2 None Seen, 0-2 /HPF    WBC, UA 0-2 None Seen, 0-2 /HPF    Bacteria, UA None Seen None Seen /HPF    Squamous Epithelial  Cells, UA 0-2 None Seen, 0-2 /HPF    Hyaline Casts, UA 0-2 None Seen /LPF    Methodology Automated Microscopy      8/22 A1C 5.8      ECG 12 Lead    Date/Time: 8/14/2023 4:06 PM  Performed by: Vilma Brown MD  Authorized by: Vilma Brown MD   Comparison: compared with previous ECG from 8/11/2022  Similar to previous ECG  Rhythm: sinus rhythm  Rate: normal  BPM: 82  Conduction: conduction normal  ST Segments: ST segments normal  T Waves: T waves normal  QRS axis: left  Clinical impression comment: stable EKG          ASSESSMENT/PLAN    Diagnoses and all orders for this visit:    1. Medicare annual wellness visit, subsequent (Primary)  Assessment & Plan:  Health maintenance - counseling given and patient refuses all vaccines; counseling re: colon CA and prostate CA screening and pt declines all screening; strongly encouraged updated eye exam and dental exam but pt notes cost barrier; (+) seat belt use; patient also declines social work consult to help with food needs    Consultants:  Patient Care Team:  Vilma Brown MD as PCP - General (Internal Medicine)  Megan Barker MD as Consulting Physician (Physical Medicine and Rehabilitation)  Gasper Childress II, MD as Consulting Physician (Pain Medicine)        2. Type 2 diabetes mellitus with hyperglycemia, without long-term current use of insulin (HCC)  Assessment & Plan:  BG control stable with A1C 5.7; encouraged reg phys activity to decr insulin resistance, moderation in unhealthy starches/sweets; pt opts for annual monitoring        3. Hypertension  Assessment & Plan:  BP stable 124/74; cont atenolol 25mg 1/2 QD #45, 3RF with low Na diet    Orders:  -     atenolol (TENORMIN) 25 MG tablet; Take 0.5 tablets by mouth Daily.  Dispense: 45 tablet; Refill: 3  -     ECG 12 Lead    4. Hyperlipidemia  Assessment & Plan:  Lipids actually stable and at goal with , ; no meds      5. Familial hypertriglyceridemia  Assessment & Plan:  TGs actually normal  106, goal < 150          FOLLOW-UP  RTC 1yr for annual PE; fasting labs the week prior to appt (CBC, CMP, TSH, lipids, UA/micro, PSA, A1C, microalb/Cr)      Electronically signed by:    Vilma Brown MD, FACP  08/14/2023

## 2023-08-14 ENCOUNTER — OFFICE VISIT (OUTPATIENT)
Dept: INTERNAL MEDICINE | Facility: CLINIC | Age: 63
End: 2023-08-14
Payer: MEDICARE

## 2023-08-14 VITALS
HEIGHT: 65 IN | HEART RATE: 94 BPM | SYSTOLIC BLOOD PRESSURE: 124 MMHG | OXYGEN SATURATION: 97 % | WEIGHT: 164 LBS | DIASTOLIC BLOOD PRESSURE: 74 MMHG | BODY MASS INDEX: 27.32 KG/M2

## 2023-08-14 DIAGNOSIS — I10 ESSENTIAL HYPERTENSION: Chronic | ICD-10-CM

## 2023-08-14 DIAGNOSIS — E11.65 TYPE 2 DIABETES MELLITUS WITH HYPERGLYCEMIA, WITHOUT LONG-TERM CURRENT USE OF INSULIN: Chronic | ICD-10-CM

## 2023-08-14 DIAGNOSIS — E78.1 FAMILIAL HYPERTRIGLYCERIDEMIA: Chronic | ICD-10-CM

## 2023-08-14 DIAGNOSIS — E78.2 MIXED HYPERLIPIDEMIA: Chronic | ICD-10-CM

## 2023-08-14 DIAGNOSIS — Z00.00 MEDICARE ANNUAL WELLNESS VISIT, SUBSEQUENT: Primary | Chronic | ICD-10-CM

## 2023-08-14 RX ORDER — ATENOLOL 25 MG/1
12.5 TABLET ORAL DAILY
Qty: 45 TABLET | Refills: 3 | Status: SHIPPED | OUTPATIENT
Start: 2023-08-14

## 2024-07-18 DIAGNOSIS — Z12.5 PROSTATE CANCER SCREENING: ICD-10-CM

## 2024-07-18 DIAGNOSIS — E11.65 TYPE 2 DIABETES MELLITUS WITH HYPERGLYCEMIA, WITHOUT LONG-TERM CURRENT USE OF INSULIN: Chronic | ICD-10-CM

## 2024-07-18 DIAGNOSIS — Z00.00 MEDICARE ANNUAL WELLNESS VISIT, SUBSEQUENT: Primary | Chronic | ICD-10-CM

## 2024-08-12 ENCOUNTER — LAB (OUTPATIENT)
Dept: LAB | Facility: HOSPITAL | Age: 64
End: 2024-08-12
Payer: MEDICARE

## 2024-08-12 DIAGNOSIS — Z12.5 PROSTATE CANCER SCREENING: ICD-10-CM

## 2024-08-12 DIAGNOSIS — Z00.00 MEDICARE ANNUAL WELLNESS VISIT, SUBSEQUENT: ICD-10-CM

## 2024-08-12 DIAGNOSIS — E11.65 TYPE 2 DIABETES MELLITUS WITH HYPERGLYCEMIA, WITHOUT LONG-TERM CURRENT USE OF INSULIN: Chronic | ICD-10-CM

## 2024-08-12 LAB
ALBUMIN SERPL-MCNC: 4.7 G/DL (ref 3.5–5.2)
ALBUMIN UR-MCNC: 1.5 MG/DL
ALBUMIN/GLOB SERPL: 1.5 G/DL
ALP SERPL-CCNC: 107 U/L (ref 39–117)
ALT SERPL W P-5'-P-CCNC: 11 U/L (ref 1–41)
ANION GAP SERPL CALCULATED.3IONS-SCNC: 11.7 MMOL/L (ref 5–15)
AST SERPL-CCNC: 19 U/L (ref 1–40)
BACTERIA UR QL AUTO: NORMAL /HPF
BASOPHILS # BLD AUTO: 0.05 10*3/MM3 (ref 0–0.2)
BASOPHILS NFR BLD AUTO: 0.5 % (ref 0–1.5)
BILIRUB SERPL-MCNC: 0.2 MG/DL (ref 0–1.2)
BILIRUB UR QL STRIP: NEGATIVE
BUN SERPL-MCNC: 19 MG/DL (ref 8–23)
BUN/CREAT SERPL: 21.6 (ref 7–25)
CALCIUM SPEC-SCNC: 9.5 MG/DL (ref 8.6–10.5)
CHLORIDE SERPL-SCNC: 105 MMOL/L (ref 98–107)
CHOLEST SERPL-MCNC: 186 MG/DL (ref 0–200)
CLARITY UR: CLEAR
CO2 SERPL-SCNC: 23.3 MMOL/L (ref 22–29)
COLOR UR: YELLOW
CREAT SERPL-MCNC: 0.88 MG/DL (ref 0.76–1.27)
CREAT UR-MCNC: 118.5 MG/DL
DEPRECATED RDW RBC AUTO: 41.8 FL (ref 37–54)
EGFRCR SERPLBLD CKD-EPI 2021: 96 ML/MIN/1.73
EOSINOPHIL # BLD AUTO: 0.09 10*3/MM3 (ref 0–0.4)
EOSINOPHIL NFR BLD AUTO: 0.9 % (ref 0.3–6.2)
ERYTHROCYTE [DISTWIDTH] IN BLOOD BY AUTOMATED COUNT: 14 % (ref 12.3–15.4)
GLOBULIN UR ELPH-MCNC: 3.1 GM/DL
GLUCOSE SERPL-MCNC: 112 MG/DL (ref 65–99)
GLUCOSE UR STRIP-MCNC: NEGATIVE MG/DL
HBA1C MFR BLD: 5.6 % (ref 4.8–5.6)
HCT VFR BLD AUTO: 51.6 % (ref 37.5–51)
HDLC SERPL-MCNC: 36 MG/DL (ref 40–60)
HGB BLD-MCNC: 17.1 G/DL (ref 13–17.7)
HGB UR QL STRIP.AUTO: NEGATIVE
HYALINE CASTS UR QL AUTO: NORMAL /LPF
IMM GRANULOCYTES # BLD AUTO: 0.02 10*3/MM3 (ref 0–0.05)
IMM GRANULOCYTES NFR BLD AUTO: 0.2 % (ref 0–0.5)
KETONES UR QL STRIP: NEGATIVE
LDLC SERPL CALC-MCNC: 121 MG/DL (ref 0–100)
LDLC/HDLC SERPL: 3.28 {RATIO}
LEUKOCYTE ESTERASE UR QL STRIP.AUTO: NEGATIVE
LYMPHOCYTES # BLD AUTO: 1.65 10*3/MM3 (ref 0.7–3.1)
LYMPHOCYTES NFR BLD AUTO: 16.2 % (ref 19.6–45.3)
MCH RBC QN AUTO: 28 PG (ref 26.6–33)
MCHC RBC AUTO-ENTMCNC: 33.1 G/DL (ref 31.5–35.7)
MCV RBC AUTO: 84.5 FL (ref 79–97)
MICROALBUMIN/CREAT UR: 12.7 MG/G (ref 0–29)
MONOCYTES # BLD AUTO: 0.43 10*3/MM3 (ref 0.1–0.9)
MONOCYTES NFR BLD AUTO: 4.2 % (ref 5–12)
NEUTROPHILS NFR BLD AUTO: 7.94 10*3/MM3 (ref 1.7–7)
NEUTROPHILS NFR BLD AUTO: 78 % (ref 42.7–76)
NITRITE UR QL STRIP: NEGATIVE
NRBC BLD AUTO-RTO: 0 /100 WBC (ref 0–0.2)
PH UR STRIP.AUTO: 5.5 [PH] (ref 5–8)
PLATELET # BLD AUTO: 259 10*3/MM3 (ref 140–450)
PMV BLD AUTO: 11.6 FL (ref 6–12)
POTASSIUM SERPL-SCNC: 4.3 MMOL/L (ref 3.5–5.2)
PROT SERPL-MCNC: 7.8 G/DL (ref 6–8.5)
PROT UR QL STRIP: NEGATIVE
PSA SERPL-MCNC: 0.5 NG/ML (ref 0–4)
RBC # BLD AUTO: 6.11 10*6/MM3 (ref 4.14–5.8)
RBC # UR STRIP: NORMAL /HPF
REF LAB TEST METHOD: NORMAL
SODIUM SERPL-SCNC: 140 MMOL/L (ref 136–145)
SP GR UR STRIP: 1.02 (ref 1–1.03)
SQUAMOUS #/AREA URNS HPF: NORMAL /HPF
TRIGL SERPL-MCNC: 160 MG/DL (ref 0–150)
TSH SERPL DL<=0.05 MIU/L-ACNC: 1.32 UIU/ML (ref 0.27–4.2)
UROBILINOGEN UR QL STRIP: NORMAL
VLDLC SERPL-MCNC: 29 MG/DL (ref 5–40)
WBC # UR STRIP: NORMAL /HPF
WBC NRBC COR # BLD AUTO: 10.18 10*3/MM3 (ref 3.4–10.8)

## 2024-08-12 PROCEDURE — G0103 PSA SCREENING: HCPCS

## 2024-08-12 PROCEDURE — 80061 LIPID PANEL: CPT

## 2024-08-12 PROCEDURE — 80053 COMPREHEN METABOLIC PANEL: CPT

## 2024-08-12 PROCEDURE — 83036 HEMOGLOBIN GLYCOSYLATED A1C: CPT

## 2024-08-12 PROCEDURE — 84443 ASSAY THYROID STIM HORMONE: CPT

## 2024-08-12 PROCEDURE — 82570 ASSAY OF URINE CREATININE: CPT

## 2024-08-12 PROCEDURE — 81001 URINALYSIS AUTO W/SCOPE: CPT

## 2024-08-12 PROCEDURE — 85025 COMPLETE CBC W/AUTO DIFF WBC: CPT

## 2024-08-12 PROCEDURE — 82043 UR ALBUMIN QUANTITATIVE: CPT

## 2024-08-19 ENCOUNTER — OFFICE VISIT (OUTPATIENT)
Dept: INTERNAL MEDICINE | Facility: CLINIC | Age: 64
End: 2024-08-19
Payer: MEDICARE

## 2024-08-19 VITALS
BODY MASS INDEX: 24.83 KG/M2 | HEIGHT: 66 IN | WEIGHT: 154.5 LBS | DIASTOLIC BLOOD PRESSURE: 80 MMHG | TEMPERATURE: 98.2 F | OXYGEN SATURATION: 97 % | SYSTOLIC BLOOD PRESSURE: 130 MMHG | HEART RATE: 99 BPM

## 2024-08-19 DIAGNOSIS — E78.2 MIXED HYPERLIPIDEMIA: Chronic | ICD-10-CM

## 2024-08-19 DIAGNOSIS — Z00.00 MEDICARE ANNUAL WELLNESS VISIT, SUBSEQUENT: Primary | ICD-10-CM

## 2024-08-19 DIAGNOSIS — I10 ESSENTIAL HYPERTENSION: Chronic | ICD-10-CM

## 2024-08-19 DIAGNOSIS — E11.65 TYPE 2 DIABETES MELLITUS WITH HYPERGLYCEMIA, WITHOUT LONG-TERM CURRENT USE OF INSULIN: Chronic | ICD-10-CM

## 2024-08-19 DIAGNOSIS — E78.1 FAMILIAL HYPERTRIGLYCERIDEMIA: Chronic | ICD-10-CM

## 2024-08-19 PROCEDURE — 3044F HG A1C LEVEL LT 7.0%: CPT | Performed by: INTERNAL MEDICINE

## 2024-08-19 PROCEDURE — 99214 OFFICE O/P EST MOD 30 MIN: CPT | Performed by: INTERNAL MEDICINE

## 2024-08-19 PROCEDURE — 1159F MED LIST DOCD IN RCRD: CPT | Performed by: INTERNAL MEDICINE

## 2024-08-19 PROCEDURE — 1170F FXNL STATUS ASSESSED: CPT | Performed by: INTERNAL MEDICINE

## 2024-08-19 PROCEDURE — 3075F SYST BP GE 130 - 139MM HG: CPT | Performed by: INTERNAL MEDICINE

## 2024-08-19 PROCEDURE — 3079F DIAST BP 80-89 MM HG: CPT | Performed by: INTERNAL MEDICINE

## 2024-08-19 PROCEDURE — 1160F RVW MEDS BY RX/DR IN RCRD: CPT | Performed by: INTERNAL MEDICINE

## 2024-08-19 PROCEDURE — 1125F AMNT PAIN NOTED PAIN PRSNT: CPT | Performed by: INTERNAL MEDICINE

## 2024-08-19 PROCEDURE — G0439 PPPS, SUBSEQ VISIT: HCPCS | Performed by: INTERNAL MEDICINE

## 2024-08-19 RX ORDER — ATENOLOL 25 MG/1
12.5 TABLET ORAL DAILY
Qty: 45 TABLET | Refills: 3 | Status: SHIPPED | OUTPATIENT
Start: 2024-08-19

## 2024-08-19 NOTE — ASSESSMENT & PLAN NOTE
Health maintenance - counseling given and patient refuses all vaccines; counseling re: colon CA and prostate CA screening and patient declines all screening; rec updated eye and dental exams but pt notes cost barrier; (+) seat belt use    Consultants:  Patient Care Team:  Vilma Brown MD as PCP - General (Internal Medicine)  Megan Barker MD as Consulting Physician (Physical Medicine and Rehabilitation)  Gasper Childress II, MD as Consulting Physician (Pain Medicine)  Miguel Carrillo MD (Psychiatry)

## 2024-08-19 NOTE — ASSESSMENT & PLAN NOTE
BG control stable with A1C 5.6; encouraged reg phys activity to decr insulin resistance, moderation in unhealthy starches/sweets; f/u A1C in 12 mos

## 2024-08-19 NOTE — PROGRESS NOTES
ANNUAL WELLNESS VISIT    DRUG AND ALCOHOL USE      no alcohol use, no tobacco use, and caffeine intake: very rarely, if he is coming to town he will get one    DIET AND PHYSICAL ACTIVITY     Diet: general    Exercise: infrequently   Exercise Details: walking    MOOD DISORDER AND COGNITIVE SCREENING     PHQ-2 Depression Screening - components reviewed with patient; screening is positive for active depression.    Little interest or pleasure in doing things? 1-->several days   Feeling down, depressed, or hopeless? 1-->several days   PHQ-2 Total Score 2      Anxiety Screening Tool Used YES     AUDIT screening  0     Mini-Cog Performed   Yes    1. Tell Patient 3 Words Car house boat     2. Administer Clock Test abnormal    3. Recall 3 words  Car house boat     4. Number Correct Items 3    FUNCTIONAL ABILITY AND LEVEL OF SAFETY   Hearing no hearing loss     Wears Hearing Aids No       Current Activities Independent      none  - see Funct/Cog Status Intake     Fall Risk Assessment       Has difficulty with walking or balance  No         Timed Up and Go (TUG) Test  9 sec.       If >12 sec, normal    ADVANCED DIRECTIVE  Advance Care Planning   ACP discussion was declined by the patient. Patient does not have an advance directive, declines further assistance. Does not want to provide information      PAIN SCREENING Do you have pain right now? yes      If so, 1-10 scale: 8     Intermittent     Do you have pain every day? Yes      Probable chronic pain: Yes     Recent Hospitalizations:  No hospitalization(s) within the last year..     MEDICATION REVIEW   - updated and reviewed (see Medication List).   - reviewed for potentially harmful drug-disease interactions in the elderly.   - reviewed for high risk medications in the elderly.   - aspirin use: No    BMI  Body mass index is 25.13 kg/m².  BMI is >= 25 and <30. (Overweight) The following options were offered after discussion;: exercise counseling/recommendations and  "nutrition counseling/recommendations       _________________________________________________________    Chief Complaint   Patient presents with    Medicare Wellness-subsequent    Diabetes    Hypertension    Hyperlipidemia       History of Present Illness  64 y.o.  male presents for updated wellness visit and f/u on BP, cholesterol, and DM. No specific concerns except surprise that he did not draw clock correctly. He denies any difficulties with managing his home life and taking care of his home.    Admits to decreasing water intake due to nighttime urination.    Review of Systems  Denies headaches, visual changes, CP, palpitations, SOB, cough, abd pain, n/v/d, difficulty with urination, numbness/tingling, falls, mood changes, lightheadedness, hearing changes, rashes. ROS (+) for nocturia, improved with decreased water intake.  ROS (+) for chronic fluid draining out of left ear when he sleeps at nighttime, no blood. Also notes relative decreased hearing on left compared to R ear.    All other ROS reviewed and negative.    Current Outpatient Medications:     ALPRAZolam (XANAX) 1 MG BID    aspirin-acetaminophen-caffeine (EXCEDRIN MIGRAINE) 250-250-65 MG prn    atenolol 25 MG 1/2 QD    fluticasone (FLONASE) 50 MCG/ACT nasal spray AD    loratadine (CLARITIN) 10 MG 1/2 QD    oxyCODONE-acetaminophen (PERCOCET)  MG TID      OPIOID SCREENING:  The patient has opioid prescription on his medication list - note chronic TID oxycodone per Dr. Jackman. Medication list has been reviewed with the patient regarding potentially high risk medications and harmful drug interactions in patients over age 65.    VITALS:  /80   Pulse 99   Temp 98.2 °F (36.8 °C)   Ht 167 cm (65.75\")   Wt 70.1 kg (154 lb 8 oz)   SpO2 97%   BMI 25.13 kg/m²     Physical Exam  Vitals and nursing note reviewed.   Constitutional:       General: He is not in acute distress.     Appearance: Normal appearance. He is well-developed. He is not " ill-appearing.   HENT:      Head: Normocephalic.      Right Ear: Tympanic membrane, ear canal and external ear normal.      Left Ear: Tympanic membrane, ear canal and external ear normal.      Ears:      Comments: Minimal cerumen floor of L auditory marc     Nose: Nose normal.   Eyes:      Extraocular Movements: Extraocular movements intact.      Conjunctiva/sclera: Conjunctivae normal.      Pupils: Pupils are equal, round, and reactive to light.   Neck:      Vascular: No carotid bruit (bilaterally).   Cardiovascular:      Rate and Rhythm: Normal rate and regular rhythm.      Heart sounds: Normal heart sounds. No murmur heard.  Pulmonary:      Effort: Pulmonary effort is normal. No respiratory distress.      Breath sounds: Normal breath sounds. No wheezing, rhonchi or rales.   Abdominal:      General: Bowel sounds are normal. There is no distension.      Palpations: Abdomen is soft. There is no mass.      Tenderness: There is no abdominal tenderness. There is no guarding or rebound.   Musculoskeletal:      Cervical back: Normal range of motion and neck supple.      Right lower leg: No edema.      Left lower leg: No edema.   Lymphadenopathy:      Cervical: No cervical adenopathy.   Skin:     General: Skin is warm and dry.      Findings: No rash.   Neurological:      Mental Status: He is alert and oriented to person, place, and time.      Gait: Gait normal.   Psychiatric:         Mood and Affect: Mood normal.         Behavior: Behavior normal.         LABS  Results for orders placed or performed in visit on 08/12/24   Comprehensive Metabolic Panel    Specimen: Blood   Result Value Ref Range    Glucose 112 (H) 65 - 99 mg/dL    BUN 19 8 - 23 mg/dL    Creatinine 0.88 0.76 - 1.27 mg/dL    Sodium 140 136 - 145 mmol/L    Potassium 4.3 3.5 - 5.2 mmol/L    Chloride 105 98 - 107 mmol/L    CO2 23.3 22.0 - 29.0 mmol/L    Calcium 9.5 8.6 - 10.5 mg/dL    Total Protein 7.8 6.0 - 8.5 g/dL    Albumin 4.7 3.5 - 5.2 g/dL    ALT  (SGPT) 11 1 - 41 U/L    AST (SGOT) 19 1 - 40 U/L    Alkaline Phosphatase 107 39 - 117 U/L    Total Bilirubin 0.2 0.0 - 1.2 mg/dL    Globulin 3.1 gm/dL    A/G Ratio 1.5 g/dL    BUN/Creatinine Ratio 21.6 7.0 - 25.0    Anion Gap 11.7 5.0 - 15.0 mmol/L    eGFR 96.0 >60.0 mL/min/1.73   Lipid Panel    Specimen: Blood   Result Value Ref Range    Total Cholesterol 186 0 - 200 mg/dL    Triglycerides 160 (H) 0 - 150 mg/dL    HDL Cholesterol 36 (L) 40 - 60 mg/dL    LDL Cholesterol  121 (H) 0 - 100 mg/dL    VLDL Cholesterol 29 5 - 40 mg/dL    LDL/HDL Ratio 3.28    TSH    Specimen: Blood   Result Value Ref Range    TSH 1.320 0.270 - 4.200 uIU/mL   Microalbumin / Creatinine Urine Ratio - Urine, Clean Catch    Specimen: Urine, Clean Catch   Result Value Ref Range    Microalbumin/Creatinine Ratio 12.7 0.0 - 29.0 mg/g    Creatinine, Urine 118.5 mg/dL    Microalbumin, Urine 1.5 mg/dL   PSA Screen    Specimen: Blood   Result Value Ref Range    PSA 0.503 0.000 - 4.000 ng/mL   Hemoglobin A1c    Specimen: Blood   Result Value Ref Range    Hemoglobin A1C 5.60 4.80 - 5.60 %   CBC Auto Differential    Specimen: Blood   Result Value Ref Range    WBC 10.18 3.40 - 10.80 10*3/mm3    RBC 6.11 (H) 4.14 - 5.80 10*6/mm3    Hemoglobin 17.1 13.0 - 17.7 g/dL    Hematocrit 51.6 (H) 37.5 - 51.0 %    MCV 84.5 79.0 - 97.0 fL    MCH 28.0 26.6 - 33.0 pg    MCHC 33.1 31.5 - 35.7 g/dL    RDW 14.0 12.3 - 15.4 %    RDW-SD 41.8 37.0 - 54.0 fl    MPV 11.6 6.0 - 12.0 fL    Platelets 259 140 - 450 10*3/mm3    Neutrophil % 78.0 (H) 42.7 - 76.0 %    Lymphocyte % 16.2 (L) 19.6 - 45.3 %    Monocyte % 4.2 (L) 5.0 - 12.0 %    Eosinophil % 0.9 0.3 - 6.2 %    Basophil % 0.5 0.0 - 1.5 %    Immature Grans % 0.2 0.0 - 0.5 %    Neutrophils, Absolute 7.94 (H) 1.70 - 7.00 10*3/mm3    Lymphocytes, Absolute 1.65 0.70 - 3.10 10*3/mm3    Monocytes, Absolute 0.43 0.10 - 0.90 10*3/mm3    Eosinophils, Absolute 0.09 0.00 - 0.40 10*3/mm3    Basophils, Absolute 0.05 0.00 - 0.20 10*3/mm3     Immature Grans, Absolute 0.02 0.00 - 0.05 10*3/mm3    nRBC 0.0 0.0 - 0.2 /100 WBC   Urinalysis without microscopic (no culture) - Urine, Clean Catch    Specimen: Urine, Clean Catch   Result Value Ref Range    Color, UA Yellow Yellow, Straw    Appearance, UA Clear Clear    pH, UA 5.5 5.0 - 8.0    Specific Gravity, UA 1.022 1.005 - 1.030    Glucose, UA Negative Negative    Ketones, UA Negative Negative    Bilirubin, UA Negative Negative    Blood, UA Negative Negative    Protein, UA Negative Negative    Leuk Esterase, UA Negative Negative    Nitrite, UA Negative Negative    Urobilinogen, UA 0.2 E.U./dL 0.2 - 1.0 E.U./dL   Urinalysis, Microscopic Only - Urine, Clean Catch    Specimen: Urine, Clean Catch   Result Value Ref Range    RBC, UA 0-2 None Seen, 0-2 /HPF    WBC, UA 0-2 None Seen, 0-2 /HPF    Bacteria, UA None Seen None Seen /HPF    Squamous Epithelial Cells, UA 0-2 None Seen, 0-2 /HPF    Hyaline Casts, UA 0-2 None Seen /LPF    Methodology Manual Light Microscopy      8/23 A1C 5.7    ASSESSMENT/PLAN    Diagnoses and all orders for this visit:    1. Medicare annual wellness visit, subsequent (Primary)  Assessment & Plan:  Health maintenance - counseling given and patient refuses all vaccines; counseling re: colon CA and prostate CA screening and patient declines all screening; rec updated eye and dental exams but pt notes cost barrier; (+) seat belt use    Consultants:  Patient Care Team:  Vilma Brown MD as PCP - General (Internal Medicine)  Megan Barker MD as Consulting Physician (Physical Medicine and Rehabilitation)  Gasper Childress II, MD as Consulting Physician (Pain Medicine)  Miguel Carrillo MD (Psychiatry)        2. Hypertension  Assessment & Plan:  BP stable 130/80; cont atenolol 25mg 1/2 QD #45, 3RF with low Na diet    Orders:  -     atenolol (TENORMIN) 25 MG tablet; Take 0.5 tablets by mouth Daily.  Dispense: 45 tablet; Refill: 3    3. Hyperlipidemia  Assessment & Plan:   Lipids with  acceptable  (goal < 130, ideally , 100); however bord  (goal < 150); no meds; decrease saturated fats and cholesterol in the diet; repeat lipids in 12 mos        4. Familial hypertriglyceridemia  Assessment & Plan:  Bord TGs 160 but this is acceptable for patient; decrease saturated fats and cholesterol in the diet; repeat lipids in 12 mos        5. Type 2 diabetes mellitus with hyperglycemia, without long-term current use of insulin  Assessment & Plan:  BG control stable with A1C 5.6; encouraged reg phys activity to decr insulin resistance, moderation in unhealthy starches/sweets; f/u A1C in 12 mos            FOLLOW-UP  RTC 1yr for annual wellness; fasting labs the week prior to appt (CBC, CMP, TSH, lipids, UA/micro, PSA, A1C, microalb/Cr)      Electronically signed by:    Vilma Brown MD, FACP  08/19/2024

## 2024-08-19 NOTE — ASSESSMENT & PLAN NOTE
Bord TGs 160 but this is acceptable for patient; decrease saturated fats and cholesterol in the diet; repeat lipids in 12 mos

## 2024-08-19 NOTE — ASSESSMENT & PLAN NOTE
Lipids with acceptable  (goal < 130, ideally , 100); however bord  (goal < 150); no meds; decrease saturated fats and cholesterol in the diet; repeat lipids in 12 mos

## 2025-08-01 DIAGNOSIS — Z12.5 SCREENING FOR PROSTATE CANCER: ICD-10-CM

## 2025-08-01 DIAGNOSIS — E11.65 TYPE 2 DIABETES MELLITUS WITH HYPERGLYCEMIA, WITHOUT LONG-TERM CURRENT USE OF INSULIN: Chronic | ICD-10-CM

## 2025-08-01 DIAGNOSIS — Z00.00 MEDICARE ANNUAL WELLNESS VISIT, SUBSEQUENT: Primary | Chronic | ICD-10-CM

## 2025-08-18 ENCOUNTER — LAB (OUTPATIENT)
Dept: LAB | Facility: HOSPITAL | Age: 65
End: 2025-08-18
Payer: MEDICARE

## 2025-08-18 DIAGNOSIS — Z12.5 SCREENING FOR PROSTATE CANCER: ICD-10-CM

## 2025-08-18 DIAGNOSIS — Z00.00 MEDICARE ANNUAL WELLNESS VISIT, SUBSEQUENT: ICD-10-CM

## 2025-08-18 DIAGNOSIS — E11.65 TYPE 2 DIABETES MELLITUS WITH HYPERGLYCEMIA, WITHOUT LONG-TERM CURRENT USE OF INSULIN: Chronic | ICD-10-CM

## 2025-08-18 LAB
ALBUMIN SERPL-MCNC: 4.5 G/DL (ref 3.5–5.2)
ALBUMIN UR-MCNC: <1.2 MG/DL
ALBUMIN/GLOB SERPL: 1.6 G/DL
ALP SERPL-CCNC: 134 U/L (ref 39–117)
ALT SERPL W P-5'-P-CCNC: 12 U/L (ref 1–41)
ANION GAP SERPL CALCULATED.3IONS-SCNC: 13.8 MMOL/L (ref 5–15)
AST SERPL-CCNC: 21 U/L (ref 1–40)
BACTERIA UR QL AUTO: NORMAL /HPF
BASOPHILS # BLD AUTO: 0.03 10*3/MM3 (ref 0–0.2)
BASOPHILS NFR BLD AUTO: 0.4 % (ref 0–1.5)
BILIRUB SERPL-MCNC: 0.5 MG/DL (ref 0–1.2)
BILIRUB UR QL STRIP: NEGATIVE
BUN SERPL-MCNC: 9 MG/DL (ref 8–23)
BUN/CREAT SERPL: 10 (ref 7–25)
CALCIUM SPEC-SCNC: 9.4 MG/DL (ref 8.6–10.5)
CHLORIDE SERPL-SCNC: 103 MMOL/L (ref 98–107)
CHOLEST SERPL-MCNC: 189 MG/DL (ref 0–200)
CLARITY UR: CLEAR
CO2 SERPL-SCNC: 22.2 MMOL/L (ref 22–29)
COLOR UR: YELLOW
CREAT SERPL-MCNC: 0.9 MG/DL (ref 0.76–1.27)
CREAT UR-MCNC: 74.1 MG/DL
DEPRECATED RDW RBC AUTO: 43.5 FL (ref 37–54)
EGFRCR SERPLBLD CKD-EPI 2021: 94.8 ML/MIN/1.73
EOSINOPHIL # BLD AUTO: 0.02 10*3/MM3 (ref 0–0.4)
EOSINOPHIL NFR BLD AUTO: 0.3 % (ref 0.3–6.2)
ERYTHROCYTE [DISTWIDTH] IN BLOOD BY AUTOMATED COUNT: 14.9 % (ref 12.3–15.4)
GLOBULIN UR ELPH-MCNC: 2.9 GM/DL
GLUCOSE SERPL-MCNC: 98 MG/DL (ref 65–99)
GLUCOSE UR STRIP-MCNC: NEGATIVE MG/DL
HBA1C MFR BLD: 5.7 % (ref 4.8–5.6)
HCT VFR BLD AUTO: 48.3 % (ref 37.5–51)
HDLC SERPL-MCNC: 44 MG/DL (ref 40–60)
HGB BLD-MCNC: 15.8 G/DL (ref 13–17.7)
HGB UR QL STRIP.AUTO: NEGATIVE
HYALINE CASTS UR QL AUTO: NORMAL /LPF
IMM GRANULOCYTES # BLD AUTO: 0.02 10*3/MM3 (ref 0–0.05)
IMM GRANULOCYTES NFR BLD AUTO: 0.3 % (ref 0–0.5)
KETONES UR QL STRIP: NEGATIVE
LDLC SERPL CALC-MCNC: 125 MG/DL (ref 0–100)
LDLC/HDLC SERPL: 2.79 {RATIO}
LEUKOCYTE ESTERASE UR QL STRIP.AUTO: NEGATIVE
LYMPHOCYTES # BLD AUTO: 1.79 10*3/MM3 (ref 0.7–3.1)
LYMPHOCYTES NFR BLD AUTO: 26.2 % (ref 19.6–45.3)
MCH RBC QN AUTO: 27.3 PG (ref 26.6–33)
MCHC RBC AUTO-ENTMCNC: 32.7 G/DL (ref 31.5–35.7)
MCV RBC AUTO: 83.6 FL (ref 79–97)
MICROALBUMIN/CREAT UR: NORMAL MG/G{CREAT}
MONOCYTES # BLD AUTO: 0.54 10*3/MM3 (ref 0.1–0.9)
MONOCYTES NFR BLD AUTO: 7.9 % (ref 5–12)
NEUTROPHILS NFR BLD AUTO: 4.43 10*3/MM3 (ref 1.7–7)
NEUTROPHILS NFR BLD AUTO: 64.9 % (ref 42.7–76)
NITRITE UR QL STRIP: NEGATIVE
NRBC BLD AUTO-RTO: 0 /100 WBC (ref 0–0.2)
PH UR STRIP.AUTO: 5.5 [PH] (ref 5–8)
PLATELET # BLD AUTO: 236 10*3/MM3 (ref 140–450)
PMV BLD AUTO: 10.4 FL (ref 6–12)
POTASSIUM SERPL-SCNC: 4.8 MMOL/L (ref 3.5–5.2)
PROT SERPL-MCNC: 7.4 G/DL (ref 6–8.5)
PROT UR QL STRIP: NEGATIVE
PSA SERPL-MCNC: 0.52 NG/ML (ref 0–4)
RBC # BLD AUTO: 5.78 10*6/MM3 (ref 4.14–5.8)
RBC # UR STRIP: NORMAL /HPF
REF LAB TEST METHOD: NORMAL
SODIUM SERPL-SCNC: 139 MMOL/L (ref 136–145)
SP GR UR STRIP: 1.01 (ref 1–1.03)
SQUAMOUS #/AREA URNS HPF: NORMAL /HPF
TRIGL SERPL-MCNC: 112 MG/DL (ref 0–150)
TSH SERPL DL<=0.05 MIU/L-ACNC: 1.66 UIU/ML (ref 0.27–4.2)
UROBILINOGEN UR QL STRIP: NORMAL
VLDLC SERPL-MCNC: 20 MG/DL (ref 5–40)
WBC # UR STRIP: NORMAL /HPF
WBC NRBC COR # BLD AUTO: 6.83 10*3/MM3 (ref 3.4–10.8)

## 2025-08-18 PROCEDURE — 84443 ASSAY THYROID STIM HORMONE: CPT

## 2025-08-18 PROCEDURE — 83036 HEMOGLOBIN GLYCOSYLATED A1C: CPT

## 2025-08-18 PROCEDURE — 81001 URINALYSIS AUTO W/SCOPE: CPT

## 2025-08-18 PROCEDURE — G0103 PSA SCREENING: HCPCS

## 2025-08-18 PROCEDURE — 80053 COMPREHEN METABOLIC PANEL: CPT

## 2025-08-18 PROCEDURE — 82570 ASSAY OF URINE CREATININE: CPT

## 2025-08-18 PROCEDURE — 82043 UR ALBUMIN QUANTITATIVE: CPT

## 2025-08-18 PROCEDURE — 85025 COMPLETE CBC W/AUTO DIFF WBC: CPT

## 2025-08-18 PROCEDURE — 80061 LIPID PANEL: CPT

## 2025-08-25 ENCOUNTER — OFFICE VISIT (OUTPATIENT)
Dept: INTERNAL MEDICINE | Facility: CLINIC | Age: 65
End: 2025-08-25
Payer: MEDICARE

## 2025-08-25 VITALS
OXYGEN SATURATION: 98 % | DIASTOLIC BLOOD PRESSURE: 74 MMHG | WEIGHT: 158.2 LBS | BODY MASS INDEX: 26.36 KG/M2 | HEART RATE: 87 BPM | SYSTOLIC BLOOD PRESSURE: 168 MMHG | HEIGHT: 65 IN

## 2025-08-25 DIAGNOSIS — E11.65 TYPE 2 DIABETES MELLITUS WITH HYPERGLYCEMIA, WITHOUT LONG-TERM CURRENT USE OF INSULIN: Chronic | ICD-10-CM

## 2025-08-25 DIAGNOSIS — Z00.00 MEDICARE ANNUAL WELLNESS VISIT, SUBSEQUENT: Primary | ICD-10-CM

## 2025-08-25 DIAGNOSIS — I10 ESSENTIAL HYPERTENSION: Chronic | ICD-10-CM

## 2025-08-25 DIAGNOSIS — T63.451S: ICD-10-CM

## 2025-08-25 DIAGNOSIS — E78.2 MIXED HYPERLIPIDEMIA: Chronic | ICD-10-CM

## 2025-08-25 DIAGNOSIS — E78.1 FAMILIAL HYPERTRIGLYCERIDEMIA: Chronic | ICD-10-CM

## 2025-08-25 PROBLEM — H53.50 COLOR BLINDNESS: Status: ACTIVE | Noted: 2025-08-25

## 2025-08-25 PROCEDURE — G0439 PPPS, SUBSEQ VISIT: HCPCS | Performed by: INTERNAL MEDICINE

## 2025-08-25 PROCEDURE — 93000 ELECTROCARDIOGRAM COMPLETE: CPT | Performed by: INTERNAL MEDICINE

## 2025-08-25 PROCEDURE — 3078F DIAST BP <80 MM HG: CPT | Performed by: INTERNAL MEDICINE

## 2025-08-25 PROCEDURE — 1125F AMNT PAIN NOTED PAIN PRSNT: CPT | Performed by: INTERNAL MEDICINE

## 2025-08-25 PROCEDURE — 3077F SYST BP >= 140 MM HG: CPT | Performed by: INTERNAL MEDICINE

## 2025-08-25 PROCEDURE — 1160F RVW MEDS BY RX/DR IN RCRD: CPT | Performed by: INTERNAL MEDICINE

## 2025-08-25 PROCEDURE — 1159F MED LIST DOCD IN RCRD: CPT | Performed by: INTERNAL MEDICINE

## 2025-08-25 PROCEDURE — 1170F FXNL STATUS ASSESSED: CPT | Performed by: INTERNAL MEDICINE

## 2025-08-25 PROCEDURE — 99214 OFFICE O/P EST MOD 30 MIN: CPT | Performed by: INTERNAL MEDICINE

## 2025-08-25 PROCEDURE — 3044F HG A1C LEVEL LT 7.0%: CPT | Performed by: INTERNAL MEDICINE

## 2025-08-25 RX ORDER — ATENOLOL 25 MG/1
12.5 TABLET ORAL DAILY
Qty: 45 TABLET | Refills: 3 | Status: SHIPPED | OUTPATIENT
Start: 2025-08-25

## 2025-08-26 DIAGNOSIS — I10 ESSENTIAL HYPERTENSION: Chronic | ICD-10-CM

## 2025-08-26 RX ORDER — ATENOLOL 25 MG/1
12.5 TABLET ORAL DAILY
Qty: 45 TABLET | Refills: 3 | OUTPATIENT
Start: 2025-08-26